# Patient Record
Sex: FEMALE | Race: OTHER | HISPANIC OR LATINO | URBAN - METROPOLITAN AREA
[De-identification: names, ages, dates, MRNs, and addresses within clinical notes are randomized per-mention and may not be internally consistent; named-entity substitution may affect disease eponyms.]

---

## 2020-12-01 ENCOUNTER — INPATIENT (INPATIENT)
Facility: HOSPITAL | Age: 55
LOS: 2 days | Discharge: HOME | End: 2020-12-04
Attending: INTERNAL MEDICINE
Payer: MEDICAID

## 2020-12-01 VITALS
OXYGEN SATURATION: 98 % | TEMPERATURE: 98 F | RESPIRATION RATE: 18 BRPM | HEART RATE: 72 BPM | DIASTOLIC BLOOD PRESSURE: 76 MMHG | SYSTOLIC BLOOD PRESSURE: 177 MMHG

## 2020-12-01 DIAGNOSIS — Z98.891 HISTORY OF UTERINE SCAR FROM PREVIOUS SURGERY: Chronic | ICD-10-CM

## 2020-12-01 DIAGNOSIS — Z90.710 ACQUIRED ABSENCE OF BOTH CERVIX AND UTERUS: Chronic | ICD-10-CM

## 2020-12-01 DIAGNOSIS — Z98.51 TUBAL LIGATION STATUS: Chronic | ICD-10-CM

## 2020-12-01 LAB
ALBUMIN SERPL ELPH-MCNC: 5 G/DL — SIGNIFICANT CHANGE UP (ref 3.5–5.2)
ALP SERPL-CCNC: 115 U/L — SIGNIFICANT CHANGE UP (ref 30–115)
ALT FLD-CCNC: 22 U/L — SIGNIFICANT CHANGE UP (ref 0–41)
ANION GAP SERPL CALC-SCNC: 12 MMOL/L — SIGNIFICANT CHANGE UP (ref 7–14)
AST SERPL-CCNC: 21 U/L — SIGNIFICANT CHANGE UP (ref 0–41)
BASOPHILS # BLD AUTO: 0.07 K/UL — SIGNIFICANT CHANGE UP (ref 0–0.2)
BASOPHILS NFR BLD AUTO: 0.6 % — SIGNIFICANT CHANGE UP (ref 0–1)
BILIRUB SERPL-MCNC: 0.4 MG/DL — SIGNIFICANT CHANGE UP (ref 0.2–1.2)
BUN SERPL-MCNC: 14 MG/DL — SIGNIFICANT CHANGE UP (ref 10–20)
CALCIUM SERPL-MCNC: 10.5 MG/DL — HIGH (ref 8.5–10.1)
CHLORIDE SERPL-SCNC: 102 MMOL/L — SIGNIFICANT CHANGE UP (ref 98–110)
CO2 SERPL-SCNC: 27 MMOL/L — SIGNIFICANT CHANGE UP (ref 17–32)
CREAT SERPL-MCNC: 0.7 MG/DL — SIGNIFICANT CHANGE UP (ref 0.7–1.5)
EOSINOPHIL # BLD AUTO: 0.18 K/UL — SIGNIFICANT CHANGE UP (ref 0–0.7)
EOSINOPHIL NFR BLD AUTO: 1.6 % — SIGNIFICANT CHANGE UP (ref 0–8)
GLUCOSE SERPL-MCNC: 111 MG/DL — HIGH (ref 70–99)
HCG SERPL QL: NEGATIVE — SIGNIFICANT CHANGE UP
HCT VFR BLD CALC: 43.4 % — SIGNIFICANT CHANGE UP (ref 37–47)
HGB BLD-MCNC: 13.9 G/DL — SIGNIFICANT CHANGE UP (ref 12–16)
IMM GRANULOCYTES NFR BLD AUTO: 0.4 % — HIGH (ref 0.1–0.3)
LACTATE SERPL-SCNC: 1 MMOL/L — SIGNIFICANT CHANGE UP (ref 0.7–2)
LYMPHOCYTES # BLD AUTO: 19.8 % — LOW (ref 20.5–51.1)
LYMPHOCYTES # BLD AUTO: 2.18 K/UL — SIGNIFICANT CHANGE UP (ref 1.2–3.4)
MCHC RBC-ENTMCNC: 26.6 PG — LOW (ref 27–31)
MCHC RBC-ENTMCNC: 32 G/DL — SIGNIFICANT CHANGE UP (ref 32–37)
MCV RBC AUTO: 83.1 FL — SIGNIFICANT CHANGE UP (ref 81–99)
MONOCYTES # BLD AUTO: 0.53 K/UL — SIGNIFICANT CHANGE UP (ref 0.1–0.6)
MONOCYTES NFR BLD AUTO: 4.8 % — SIGNIFICANT CHANGE UP (ref 1.7–9.3)
NEUTROPHILS # BLD AUTO: 8.03 K/UL — HIGH (ref 1.4–6.5)
NEUTROPHILS NFR BLD AUTO: 72.8 % — SIGNIFICANT CHANGE UP (ref 42.2–75.2)
NRBC # BLD: 0 /100 WBCS — SIGNIFICANT CHANGE UP (ref 0–0)
PLATELET # BLD AUTO: 385 K/UL — SIGNIFICANT CHANGE UP (ref 130–400)
POTASSIUM SERPL-MCNC: 4.8 MMOL/L — SIGNIFICANT CHANGE UP (ref 3.5–5)
POTASSIUM SERPL-SCNC: 4.8 MMOL/L — SIGNIFICANT CHANGE UP (ref 3.5–5)
PROT SERPL-MCNC: 8.2 G/DL — HIGH (ref 6–8)
RBC # BLD: 5.22 M/UL — SIGNIFICANT CHANGE UP (ref 4.2–5.4)
RBC # FLD: 14.7 % — HIGH (ref 11.5–14.5)
SARS-COV-2 RNA SPEC QL NAA+PROBE: SIGNIFICANT CHANGE UP
SODIUM SERPL-SCNC: 141 MMOL/L — SIGNIFICANT CHANGE UP (ref 135–146)
WBC # BLD: 11.03 K/UL — HIGH (ref 4.8–10.8)
WBC # FLD AUTO: 11.03 K/UL — HIGH (ref 4.8–10.8)

## 2020-12-01 PROCEDURE — 99223 1ST HOSP IP/OBS HIGH 75: CPT

## 2020-12-01 PROCEDURE — 99285 EMERGENCY DEPT VISIT HI MDM: CPT

## 2020-12-01 PROCEDURE — 70491 CT SOFT TISSUE NECK W/DYE: CPT | Mod: 26

## 2020-12-01 RX ORDER — CHLORHEXIDINE GLUCONATE 213 G/1000ML
1 SOLUTION TOPICAL
Refills: 0 | Status: DISCONTINUED | OUTPATIENT
Start: 2020-12-01 | End: 2020-12-04

## 2020-12-01 RX ORDER — AMPICILLIN SODIUM AND SULBACTAM SODIUM 250; 125 MG/ML; MG/ML
3 INJECTION, POWDER, FOR SUSPENSION INTRAMUSCULAR; INTRAVENOUS ONCE
Refills: 0 | Status: COMPLETED | OUTPATIENT
Start: 2020-12-01 | End: 2020-12-01

## 2020-12-01 RX ORDER — OXYCODONE AND ACETAMINOPHEN 5; 325 MG/1; MG/1
1 TABLET ORAL EVERY 6 HOURS
Refills: 0 | Status: DISCONTINUED | OUTPATIENT
Start: 2020-12-01 | End: 2020-12-04

## 2020-12-01 RX ORDER — INFLUENZA VIRUS VACCINE 15; 15; 15; 15 UG/.5ML; UG/.5ML; UG/.5ML; UG/.5ML
0.5 SUSPENSION INTRAMUSCULAR ONCE
Refills: 0 | Status: DISCONTINUED | OUTPATIENT
Start: 2020-12-01 | End: 2020-12-04

## 2020-12-01 RX ORDER — PANTOPRAZOLE SODIUM 20 MG/1
40 TABLET, DELAYED RELEASE ORAL
Refills: 0 | Status: DISCONTINUED | OUTPATIENT
Start: 2020-12-01 | End: 2020-12-04

## 2020-12-01 RX ORDER — HYDROCHLOROTHIAZIDE 25 MG
25 TABLET ORAL DAILY
Refills: 0 | Status: DISCONTINUED | OUTPATIENT
Start: 2020-12-01 | End: 2020-12-04

## 2020-12-01 RX ORDER — MORPHINE SULFATE 50 MG/1
6 CAPSULE, EXTENDED RELEASE ORAL ONCE
Refills: 0 | Status: DISCONTINUED | OUTPATIENT
Start: 2020-12-01 | End: 2020-12-01

## 2020-12-01 RX ORDER — ACETAMINOPHEN 500 MG
650 TABLET ORAL EVERY 6 HOURS
Refills: 0 | Status: DISCONTINUED | OUTPATIENT
Start: 2020-12-01 | End: 2020-12-04

## 2020-12-01 RX ORDER — DEXAMETHASONE 0.5 MG/5ML
6 ELIXIR ORAL EVERY 8 HOURS
Refills: 0 | Status: DISCONTINUED | OUTPATIENT
Start: 2020-12-01 | End: 2020-12-03

## 2020-12-01 RX ORDER — SODIUM CHLORIDE 9 MG/ML
1000 INJECTION INTRAMUSCULAR; INTRAVENOUS; SUBCUTANEOUS ONCE
Refills: 0 | Status: COMPLETED | OUTPATIENT
Start: 2020-12-01 | End: 2020-12-01

## 2020-12-01 RX ORDER — KETOROLAC TROMETHAMINE 30 MG/ML
15 SYRINGE (ML) INJECTION ONCE
Refills: 0 | Status: DISCONTINUED | OUTPATIENT
Start: 2020-12-01 | End: 2020-12-01

## 2020-12-01 RX ORDER — MORPHINE SULFATE 50 MG/1
2 CAPSULE, EXTENDED RELEASE ORAL ONCE
Refills: 0 | Status: DISCONTINUED | OUTPATIENT
Start: 2020-12-01 | End: 2020-12-01

## 2020-12-01 RX ORDER — DEXAMETHASONE 0.5 MG/5ML
6 ELIXIR ORAL ONCE
Refills: 0 | Status: COMPLETED | OUTPATIENT
Start: 2020-12-01 | End: 2020-12-01

## 2020-12-01 RX ORDER — TRAMADOL HYDROCHLORIDE 50 MG/1
25 TABLET ORAL EVERY 6 HOURS
Refills: 0 | Status: DISCONTINUED | OUTPATIENT
Start: 2020-12-01 | End: 2020-12-04

## 2020-12-01 RX ADMIN — Medication 15 MILLIGRAM(S): at 16:21

## 2020-12-01 RX ADMIN — MORPHINE SULFATE 2 MILLIGRAM(S): 50 CAPSULE, EXTENDED RELEASE ORAL at 12:23

## 2020-12-01 RX ADMIN — Medication 101.2 MILLIGRAM(S): at 19:52

## 2020-12-01 RX ADMIN — SODIUM CHLORIDE 1000 MILLILITER(S): 9 INJECTION INTRAMUSCULAR; INTRAVENOUS; SUBCUTANEOUS at 12:23

## 2020-12-01 RX ADMIN — AMPICILLIN SODIUM AND SULBACTAM SODIUM 200 GRAM(S): 250; 125 INJECTION, POWDER, FOR SUSPENSION INTRAMUSCULAR; INTRAVENOUS at 18:19

## 2020-12-01 RX ADMIN — Medication 650 MILLIGRAM(S): at 23:25

## 2020-12-01 RX ADMIN — MORPHINE SULFATE 6 MILLIGRAM(S): 50 CAPSULE, EXTENDED RELEASE ORAL at 18:19

## 2020-12-01 NOTE — ED PROVIDER NOTE - OBJECTIVE STATEMENT
54 y.o female w/ hx of HTN presents to the ED for evaluation of R facial swelling x 2 days.  States she developed pain R submandibular region yesterday, woke up this morning and area was significantly swollen.  Constant pain, throbbing, mild severity, no radiation of pain.  No trismus, fever, chills, difficulty tolerating secretions, odynophagia, headache, dyspnea, chest pain.

## 2020-12-01 NOTE — ED PROVIDER NOTE - PROGRESS NOTE DETAILS
spoke w/ ent/  aware of consult. spoke w/ ent.  recommends admission to medicine.  decadron 6mg q8, abx, dental consult in AM, pain control, warm compresses. mar aware of admission.

## 2020-12-01 NOTE — ED PROVIDER NOTE - CARE PLAN
Principal Discharge DX:	Sialolithiasis  Secondary Diagnosis:	Sialoadenitis  Secondary Diagnosis:	Intractable pain

## 2020-12-01 NOTE — H&P ADULT - HISTORY OF PRESENT ILLNESS
54F with PMH of HTN presents to the ED complaining of R submandibular swelling x 2 days.  Pt states that the cap that was implanted over her tooth in her lower R jaw had fallen out approximately 2 weeks ago and she had since tried to make an appointment w/ her dentist to repair her cap/tooth. Pt states that she developed pain R submandibular region 10/10 yesterday and woke up this morning with worsened pain and swelling of the area. Pain radiates to R ear as well. Pt had taken advil yesterday but it has not helped her pain. Pt endorses constant worsening pain in her R submandibular region and R anterior neck, odynophagia that is worsened upon eating/drinking and mild trismus. Pt denies fevers, chills, difficulty tolerating secretions, headache, n/v, dyspnea.    In the ED patient was afebrile, normotensive, sat well on RA. Labs notable for leukocytosis 11.03, hypercalcemia 10.5.     CT neck w/IV cont showed sialolithiasis and postobstructive sialoadenitis of the right submandibular gland with significant surrounding inflammatory changes and edema involving the parapharyngeal, submandibular and carotid spaces. Numerous proximal Maurilio's duct stones measure up to 6 mm. Prominent level 1A and right level IIb lymph nodes, likely reactive in nature. Atherosclerotic calcification of the distal right ICA/proximal M1 segment of the MCA    PAST MEDICAL & SURGICAL HISTORY  HTN (hypertension)    SOCIAL HISTORY:  Negative for smoking/alcohol/drug use.     ALLERGIES:  No Known Allergies    MEDICATIONS:  HOME MEDICATIONS    STANDING MEDICATIONS    PRN MEDICATIONS    VITALS:   ICU Vital Signs Last 24 Hrs  T(C): 37.1 (01 Dec 2020 20:32), Max: 37.1 (01 Dec 2020 20:32)  T(F): 98.8 (01 Dec 2020 20:32), Max: 98.8 (01 Dec 2020 20:32)  HR: 98 (01 Dec 2020 20:32) (72 - 98)  BP: 123/71 (01 Dec 2020 20:32) (123/71 - 177/76)  BP(mean): --  ABP: --  ABP(mean): --  RR: 18 (01 Dec 2020 20:32) (18 - 18)  SpO2: 98% (01 Dec 2020 20:32) (98% - 98%)    LABS:                        13.9   11.03 )-----------( 385      ( 01 Dec 2020 12:46 )             43.4     12-01    141  |  102  |  14  ----------------------------<  111<H>  4.8   |  27  |  0.7    Ca    10.5<H>      01 Dec 2020 12:46    TPro  8.2<H>  /  Alb  5.0  /  TBili  0.4  /  DBili  x   /  AST  21  /  ALT  22  /  AlkPhos  115  12-01    I&O's Detail      Lactate, Blood: 1.0 mmol/L (12-01-20 @ 12:46)    PHYSICAL EXAM:  GEN: No acute distress  HEENT: Normocephalic  NECK: R submandibular edema, warm to touch  LUNGS: Decreased breathe sounds  HEART: Regular  ABD: Soft, non-tender, non-distended.  EXT: NE/2+PP  NEURO: AAOX3  PSYCH: Appropriate mood, responds appropriately

## 2020-12-01 NOTE — ED PROVIDER NOTE - PHYSICAL EXAMINATION
CONST: Well appearing in NAD  EYES: Sclera and conjunctiva clear.  ENT:   Oropharynx normal appearing, no erythema or exudates. Uvula midline., no trismus, tolerating secretions, mild TTP tooth + 31.   NECK: + R anterior neck swelling and tenderness, no overlying skin changes. no meningeal signs, supple  CARD: Normal S1 S2; Normal rate and rhythm  RESP: Equal BS B/L, No wheezes, rhonchi or rales. No distress  GI: Soft, non-tender, non-distended.  MS: Normal ROM in all extremities. No edema of lower extremities, no calf pain, radial pulses 2+ bilaterally  SKIN: Warm, dry, no acute rashes. Good turgor  NEURO: A&Ox3, No focal deficits. Strength 5/5 with no sensory deficits. Steady gait

## 2020-12-01 NOTE — CONSULT NOTE ADULT - ASSESSMENT
54 year old female w/ hx of HTN complaining of R sided submandibular and R sided facial pain and swelling x 2 days.    ·	No acute ENT intervention indicated at this time  ·	Warm compress to the R submandibular region  ·	Sialogogues to help treat obstruction  ·	Continue IV ABX  ·	Continue pain control  ·	Consider dental consult to evaluate tooth pain  ·	Encourage po water intake  ·	Will d/w attending 54 year old female w/ hx of HTN complaining of R sided submandibular and R sided facial pain and swelling x 2 days.     ·	Case discussed w/ Dr Ashford, recommend decadron 6mg q 8 hrs   ·	Admit to medicine, monitor vitals  ·	Warm compress to the R submandibular region  ·	Continue IV ABX  ·	Continue pain control  ·	Consider dental consult to evaluate tooth pain  ·	Encourage po water intake

## 2020-12-01 NOTE — ED PROVIDER NOTE - CLINICAL SUMMARY MEDICAL DECISION MAKING FREE TEXT BOX
54 year old female with a pmh of htn presents here for facial / neck swelling. Patient states 2 weeks ago she had a cap fall off her tooth. Patient  began having mild tooth pain yesterday which increased over night. Patient noticed right neck swelling and pain radiating to her throat and ear which was what prompted her to come to the ed. No fever chills sob difficulty swallowing or speaking. VS reviewed. Labs imaging obtained and reviewed. Patient found to have Findings consistent with sialolithiasis and postobstructive sialoadenitis of the right submandibular gland with significant surrounding inflammatory changes and edema involving the parapharyngeal, submandibular and carotid spaces. Numerous proximal Maurilio's duct stones measure up to 6 mm. Multiple dose of pain control given. ENT consulted who recommended admission for pain control and IV antibiotics.

## 2020-12-01 NOTE — ED PROVIDER NOTE - ATTENDING CONTRIBUTION TO CARE
54 year old female with a pmh of htn presents here for facial / neck swelling. Patient states 2 weeks ago she had a cap fall off her tooth. Patient  began having mild tooth pain yesterday which increased over night. Patient noticed right neck swelling and pain radiating to her throat and ear which was what prompted her to come to the ed. No fever chills sob difficulty swallowing or speeching. No n/v abdominal pain or cp.  On exam  CONSTITUTIONAL: WA / WN / NAD  HEAD: NCAT  EYES: PERRL; EOMI; anicteric.  ENT: Normal pharynx; mucous membranes pink/moist, no erythema.  Airway patent. No tonsillar exudates. No swelling underneath tongue. +no apical abscess seen at tooth #31. Ears TM'S normal BL  NECK: Supple; + right anterior neck swelling. No erythema + warm to touch.   CARD: RRR; nl S1/S2; no M/R/G.   RESP: Respiratory rate and effort are normal; breath sounds clear and equal bilaterally.  ABD: Soft, NT ND nl bowel sounds; no masses; no rebound  MSK/EXT: No gross deformities; full range of motion.  SKIN: Warm and dry;   NEURO: AAOx3, Motor 5/5 x 4 extremities, Sensations intact to pain and palpation, Cerebellar testing normal  PSYCH: Memory Intact, Normal Affect

## 2020-12-01 NOTE — ED ADULT TRIAGE NOTE - CHIEF COMPLAINT QUOTE
Pt c/o right bottom dental pain and left jaw swelling since yesterday. Denies SOB, trouble breathing.

## 2020-12-01 NOTE — H&P ADULT - ASSESSMENT
54F with PMH of HTN presents to the ED complaining of R submandibular swelling    #R submandibular cellulitis  - CT neck showed sialolithiasis and postobstructive sialoadenitis of the right submandibular gland with significant surrounding inflammatory changes and edema involving the parapharyngeal, submandibular and carotid spaces. Numerous proximal [place]'s duct stones measure up to 6 mm. Prominent level 1A and right level IIb lymph nodes, likely reactive in nature  - Leukocytosis  - ENT recs decadron 6mg q 8 hrs, IV abx, pain control and dental consult  - f/u procalc, blood cx    #HTN - c/w home hydrochlorothiazide    #Hyperglycemia - f/u a1c, lipid panel    Diet - carb consistent  DVT ppx - lovenox  Activity - IAT  Dispo - med/surg  GI ppx - protonix 54F with PMH of HTN presents to the ED complaining of R submandibular swelling    #R submandibular mass  - cellulitis vs abscess  - CT neck showed sialolithiasis and postobstructive sialoadenitis of the right submandibular gland with significant surrounding inflammatory changes and edema involving the parapharyngeal, submandibular and carotid spaces. Numerous proximal Maurilio's duct stones measure up to 6 mm. Prominent level 1A and right level IIb lymph nodes, likely reactive in nature  - Leukocytosis  - ENT recs decadron 6mg q 8 hrs, IV abx, pain control and dental consult  - f/u procalc, blood cx    #HTN - c/w home hydrochlorothiazide    #Hyperglycemia - f/u a1c, lipid panel    Diet - carb consistent  DVT ppx - lovenox  Activity - IAT  Dispo - med/surg  GI ppx - protonix 54F with PMH of HTN presents to the ED complaining of R submandibular swelling    #R submandibular mass  - cellulitis vs abscess  - CT neck showed sialolithiasis and postobstructive sialoadenitis of the right submandibular gland with significant surrounding inflammatory changes and edema involving the parapharyngeal, submandibular and carotid spaces. Numerous proximal Maurilio's duct stones measure up to 6 mm. Prominent level 1A and right level IIb lymph nodes, likely reactive in nature  - Leukocytosis  - ENT recs decadron 6mg q 8 hrs, IV abx, pain control and dental consult  - Started unasyn 3g q6h x 7d for Odontogenic infection  - f/u procalc, blood cx    #HTN - c/w home hydrochlorothiazide    #Hyperglycemia - f/u a1c, lipid panel    Diet - carb consistent  DVT ppx - lovenox  Activity - IAT  Dispo - med/surg  GI ppx - protonix 54F with PMH of HTN presents to the ED complaining of R submandibular swelling    #R submandibular mass  - cellulitis vs developing abscess  - CT neck showed sialolithiasis and postobstructive sialoadenitis of the right submandibular gland with significant surrounding inflammatory changes and edema involving the parapharyngeal, submandibular and carotid spaces. Numerous proximal Maurilio's duct stones measure up to 6 mm. Prominent level 1A and right level IIb lymph nodes, likely reactive in nature  - Leukocytosis  - ENT recs decadron 6mg q 8 hrs, IV abx, pain control and dental consult  - Started unasyn 3g q6h x 7d for Odontogenic infection  - f/u procalc, blood cx    #HTN - c/w home hydrochlorothiazide    #Hyperglycemia - f/u a1c, lipid panel    Diet - carb consistent  DVT ppx - lovenox  Activity - IAT  Dispo - med/surg  GI ppx - protonix 54F with PMH of HTN presents to the ED complaining of R submandibular swelling    #R submandibular swelling  - cellulitis vs developing abscess  - CT neck showed sialolithiasis and postobstructive sialoadenitis of the right submandibular gland with significant surrounding inflammatory changes and edema involving the parapharyngeal, submandibular and carotid spaces. Numerous proximal [place]'s duct stones measure up to 6 mm. Prominent level 1A and right level IIb lymph nodes, likely reactive in nature  - Leukocytosis  - ENT recs decadron 6mg q 8 hrs, IV abx, pain control and dental consult  - Started unasyn 3g q6h x 7d for Odontogenic infection  - f/u procalc, blood cx    #HTN - c/w home hydrochlorothiazide    #Hyperglycemia - f/u a1c, lipid panel    Diet - carb consistent  DVT ppx - lovenox  Activity - IAT  Dispo - med/surg  GI ppx - protonix

## 2020-12-01 NOTE — ED PROVIDER NOTE - NS ED ROS FT
Constitutional: See HPI.  Eyes: No visual changes, eye pain or discharge.   ENMT: + submandibular swelling. No hearing changes, pain, discharge or infections.   Cardiac: No SOB or edema. No chest pain with exertion.  Respiratory: No cough or respiratory distress.   GI: No nausea, vomiting, diarrhea or abdominal pain.  : No dysuria, frequency or burning. No Discharge  MS: No myalgia, muscle weakness, joint pain or back pain.  Neuro: No headache or weakness.   Skin: No skin rash.  Except as documented in the HPI, all other systems are negative.

## 2020-12-01 NOTE — H&P ADULT - NSICDXPASTSURGICALHX_GEN_ALL_CORE_FT
PAST SURGICAL HISTORY:  History of 2  sections     History of bilateral ligation of fallopian tubes     History of hysterectomy

## 2020-12-02 LAB
A1C WITH ESTIMATED AVERAGE GLUCOSE RESULT: 6.3 % — HIGH (ref 4–5.6)
ALBUMIN SERPL ELPH-MCNC: 4.5 G/DL — SIGNIFICANT CHANGE UP (ref 3.5–5.2)
ALP SERPL-CCNC: 104 U/L — SIGNIFICANT CHANGE UP (ref 30–115)
ALT FLD-CCNC: 16 U/L — SIGNIFICANT CHANGE UP (ref 0–41)
ANION GAP SERPL CALC-SCNC: 15 MMOL/L — HIGH (ref 7–14)
APPEARANCE UR: CLEAR — SIGNIFICANT CHANGE UP
AST SERPL-CCNC: 12 U/L — SIGNIFICANT CHANGE UP (ref 0–41)
BACTERIA # UR AUTO: NEGATIVE — SIGNIFICANT CHANGE UP
BASOPHILS # BLD AUTO: 0.01 K/UL — SIGNIFICANT CHANGE UP (ref 0–0.2)
BASOPHILS NFR BLD AUTO: 0.1 % — SIGNIFICANT CHANGE UP (ref 0–1)
BILIRUB SERPL-MCNC: 0.5 MG/DL — SIGNIFICANT CHANGE UP (ref 0.2–1.2)
BILIRUB UR-MCNC: NEGATIVE — SIGNIFICANT CHANGE UP
BLD GP AB SCN SERPL QL: SIGNIFICANT CHANGE UP
BUN SERPL-MCNC: 14 MG/DL — SIGNIFICANT CHANGE UP (ref 10–20)
CALCIUM SERPL-MCNC: 9.4 MG/DL — SIGNIFICANT CHANGE UP (ref 8.5–10.1)
CHLORIDE SERPL-SCNC: 100 MMOL/L — SIGNIFICANT CHANGE UP (ref 98–110)
CHOLEST SERPL-MCNC: 198 MG/DL — SIGNIFICANT CHANGE UP
CO2 SERPL-SCNC: 24 MMOL/L — SIGNIFICANT CHANGE UP (ref 17–32)
COLOR SPEC: YELLOW — SIGNIFICANT CHANGE UP
COMMENT - URINE: SIGNIFICANT CHANGE UP
CREAT SERPL-MCNC: 0.7 MG/DL — SIGNIFICANT CHANGE UP (ref 0.7–1.5)
DIFF PNL FLD: ABNORMAL
EOSINOPHIL # BLD AUTO: 0 K/UL — SIGNIFICANT CHANGE UP (ref 0–0.7)
EOSINOPHIL NFR BLD AUTO: 0 % — SIGNIFICANT CHANGE UP (ref 0–8)
EPI CELLS # UR: 12 /HPF — HIGH (ref 0–5)
ESTIMATED AVERAGE GLUCOSE: 134 MG/DL — HIGH (ref 68–114)
GLUCOSE SERPL-MCNC: 185 MG/DL — HIGH (ref 70–99)
GLUCOSE UR QL: NEGATIVE — SIGNIFICANT CHANGE UP
HCT VFR BLD CALC: 38.9 % — SIGNIFICANT CHANGE UP (ref 37–47)
HDLC SERPL-MCNC: 71 MG/DL — SIGNIFICANT CHANGE UP
HGB BLD-MCNC: 12.6 G/DL — SIGNIFICANT CHANGE UP (ref 12–16)
HYALINE CASTS # UR AUTO: 3 /LPF — SIGNIFICANT CHANGE UP (ref 0–7)
IMM GRANULOCYTES NFR BLD AUTO: 0.6 % — HIGH (ref 0.1–0.3)
KETONES UR-MCNC: ABNORMAL
LACTATE SERPL-SCNC: 1.2 MMOL/L — SIGNIFICANT CHANGE UP (ref 0.7–2)
LEUKOCYTE ESTERASE UR-ACNC: NEGATIVE — SIGNIFICANT CHANGE UP
LIPID PNL WITH DIRECT LDL SERPL: 123 MG/DL — HIGH
LYMPHOCYTES # BLD AUTO: 0.78 K/UL — LOW (ref 1.2–3.4)
LYMPHOCYTES # BLD AUTO: 6.2 % — LOW (ref 20.5–51.1)
MCHC RBC-ENTMCNC: 26.6 PG — LOW (ref 27–31)
MCHC RBC-ENTMCNC: 32.4 G/DL — SIGNIFICANT CHANGE UP (ref 32–37)
MCV RBC AUTO: 82.2 FL — SIGNIFICANT CHANGE UP (ref 81–99)
MONOCYTES # BLD AUTO: 0.3 K/UL — SIGNIFICANT CHANGE UP (ref 0.1–0.6)
MONOCYTES NFR BLD AUTO: 2.4 % — SIGNIFICANT CHANGE UP (ref 1.7–9.3)
NEUTROPHILS # BLD AUTO: 11.5 K/UL — HIGH (ref 1.4–6.5)
NEUTROPHILS NFR BLD AUTO: 90.7 % — HIGH (ref 42.2–75.2)
NITRITE UR-MCNC: NEGATIVE — SIGNIFICANT CHANGE UP
NON HDL CHOLESTEROL: 127 MG/DL — SIGNIFICANT CHANGE UP
NRBC # BLD: 0 /100 WBCS — SIGNIFICANT CHANGE UP (ref 0–0)
PH UR: 6.5 — SIGNIFICANT CHANGE UP (ref 5–8)
PLATELET # BLD AUTO: 363 K/UL — SIGNIFICANT CHANGE UP (ref 130–400)
POTASSIUM SERPL-MCNC: 4.3 MMOL/L — SIGNIFICANT CHANGE UP (ref 3.5–5)
POTASSIUM SERPL-SCNC: 4.3 MMOL/L — SIGNIFICANT CHANGE UP (ref 3.5–5)
PROCALCITONIN SERPL-MCNC: 0.07 NG/ML — SIGNIFICANT CHANGE UP (ref 0.02–0.1)
PROT SERPL-MCNC: 7.5 G/DL — SIGNIFICANT CHANGE UP (ref 6–8)
PROT UR-MCNC: ABNORMAL
RBC # BLD: 4.73 M/UL — SIGNIFICANT CHANGE UP (ref 4.2–5.4)
RBC # FLD: 14.6 % — HIGH (ref 11.5–14.5)
RBC CASTS # UR COMP ASSIST: 6 /HPF — HIGH (ref 0–4)
SODIUM SERPL-SCNC: 139 MMOL/L — SIGNIFICANT CHANGE UP (ref 135–146)
SP GR SPEC: 1.04 — HIGH (ref 1.01–1.03)
TRIGL SERPL-MCNC: 102 MG/DL — SIGNIFICANT CHANGE UP
UROBILINOGEN FLD QL: SIGNIFICANT CHANGE UP
WBC # BLD: 12.67 K/UL — HIGH (ref 4.8–10.8)
WBC # FLD AUTO: 12.67 K/UL — HIGH (ref 4.8–10.8)
WBC UR QL: 5 /HPF — SIGNIFICANT CHANGE UP (ref 0–5)

## 2020-12-02 PROCEDURE — 93010 ELECTROCARDIOGRAM REPORT: CPT

## 2020-12-02 PROCEDURE — 99232 SBSQ HOSP IP/OBS MODERATE 35: CPT

## 2020-12-02 PROCEDURE — 99233 SBSQ HOSP IP/OBS HIGH 50: CPT | Mod: GC

## 2020-12-02 RX ORDER — SODIUM CHLORIDE 9 MG/ML
1000 INJECTION, SOLUTION INTRAVENOUS
Refills: 0 | Status: DISCONTINUED | OUTPATIENT
Start: 2020-12-02 | End: 2020-12-04

## 2020-12-02 RX ORDER — ENOXAPARIN SODIUM 100 MG/ML
40 INJECTION SUBCUTANEOUS EVERY 12 HOURS
Refills: 0 | Status: DISCONTINUED | OUTPATIENT
Start: 2020-12-02 | End: 2020-12-03

## 2020-12-02 RX ORDER — AMPICILLIN SODIUM AND SULBACTAM SODIUM 250; 125 MG/ML; MG/ML
3 INJECTION, POWDER, FOR SUSPENSION INTRAMUSCULAR; INTRAVENOUS EVERY 6 HOURS
Refills: 0 | Status: DISCONTINUED | OUTPATIENT
Start: 2020-12-02 | End: 2020-12-04

## 2020-12-02 RX ORDER — ONDANSETRON 8 MG/1
2 TABLET, FILM COATED ORAL EVERY 6 HOURS
Refills: 0 | Status: DISCONTINUED | OUTPATIENT
Start: 2020-12-02 | End: 2020-12-04

## 2020-12-02 RX ORDER — BENZOCAINE 10 %
1 GEL (GRAM) MUCOUS MEMBRANE
Refills: 0 | Status: DISCONTINUED | OUTPATIENT
Start: 2020-12-02 | End: 2020-12-04

## 2020-12-02 RX ADMIN — SODIUM CHLORIDE 75 MILLILITER(S): 9 INJECTION, SOLUTION INTRAVENOUS at 10:45

## 2020-12-02 RX ADMIN — AMPICILLIN SODIUM AND SULBACTAM SODIUM 200 GRAM(S): 250; 125 INJECTION, POWDER, FOR SUSPENSION INTRAMUSCULAR; INTRAVENOUS at 05:05

## 2020-12-02 RX ADMIN — Medication 1 SPRAY(S): at 10:49

## 2020-12-02 RX ADMIN — ONDANSETRON 2 MILLIGRAM(S): 8 TABLET, FILM COATED ORAL at 17:13

## 2020-12-02 RX ADMIN — ENOXAPARIN SODIUM 40 MILLIGRAM(S): 100 INJECTION SUBCUTANEOUS at 17:13

## 2020-12-02 RX ADMIN — ENOXAPARIN SODIUM 40 MILLIGRAM(S): 100 INJECTION SUBCUTANEOUS at 05:13

## 2020-12-02 RX ADMIN — AMPICILLIN SODIUM AND SULBACTAM SODIUM 200 GRAM(S): 250; 125 INJECTION, POWDER, FOR SUSPENSION INTRAMUSCULAR; INTRAVENOUS at 17:13

## 2020-12-02 RX ADMIN — OXYCODONE AND ACETAMINOPHEN 1 TABLET(S): 5; 325 TABLET ORAL at 05:32

## 2020-12-02 RX ADMIN — SODIUM CHLORIDE 75 MILLILITER(S): 9 INJECTION, SOLUTION INTRAVENOUS at 23:15

## 2020-12-02 RX ADMIN — AMPICILLIN SODIUM AND SULBACTAM SODIUM 200 GRAM(S): 250; 125 INJECTION, POWDER, FOR SUSPENSION INTRAMUSCULAR; INTRAVENOUS at 11:01

## 2020-12-02 RX ADMIN — Medication 25 MILLIGRAM(S): at 05:14

## 2020-12-02 RX ADMIN — OXYCODONE AND ACETAMINOPHEN 1 TABLET(S): 5; 325 TABLET ORAL at 19:16

## 2020-12-02 RX ADMIN — OXYCODONE AND ACETAMINOPHEN 1 TABLET(S): 5; 325 TABLET ORAL at 18:35

## 2020-12-02 RX ADMIN — OXYCODONE AND ACETAMINOPHEN 1 TABLET(S): 5; 325 TABLET ORAL at 12:30

## 2020-12-02 RX ADMIN — Medication 6 MILLIGRAM(S): at 22:55

## 2020-12-02 RX ADMIN — OXYCODONE AND ACETAMINOPHEN 1 TABLET(S): 5; 325 TABLET ORAL at 11:34

## 2020-12-02 RX ADMIN — PANTOPRAZOLE SODIUM 40 MILLIGRAM(S): 20 TABLET, DELAYED RELEASE ORAL at 05:14

## 2020-12-02 RX ADMIN — Medication 6 MILLIGRAM(S): at 05:14

## 2020-12-02 RX ADMIN — Medication 6 MILLIGRAM(S): at 15:41

## 2020-12-02 RX ADMIN — AMPICILLIN SODIUM AND SULBACTAM SODIUM 200 GRAM(S): 250; 125 INJECTION, POWDER, FOR SUSPENSION INTRAMUSCULAR; INTRAVENOUS at 23:15

## 2020-12-02 RX ADMIN — AMPICILLIN SODIUM AND SULBACTAM SODIUM 200 GRAM(S): 250; 125 INJECTION, POWDER, FOR SUSPENSION INTRAMUSCULAR; INTRAVENOUS at 00:28

## 2020-12-02 NOTE — PROGRESS NOTE ADULT - ASSESSMENT
54 year old female w/ sialoadenitis and sialolithiasis    ·	Warm compress to the R submandibular region  ·	Encourage frequent gentle massage of R submandibular region  ·	Continue IV ABX  ·	IV fluid hydration  ·	Continue pain control  ·	Encourage po water intake  ·	Seen with Dr. Olivera

## 2020-12-02 NOTE — PROGRESS NOTE ADULT - SUBJECTIVE AND OBJECTIVE BOX
ENT DAILY PROGRESS NOTE  55 y/o female with sialoadenitis and sialolithiasis on the right submandibular gland. Pt seen and examined at bedside. Pt reports passing 4 stones last night and had some subsequent bleeding which resolved on its own. Continues to have considerable jaw discomfort on the right. Deneis fevers, chills, N/V.     REVIEW OF SYSTEMS   [x] A ten-point review of systems was otherwise negative except as noted.  [ ] Due to altered mental status/intubation, subjective information were not able to be obtained from patient. History was obtained, to the extent possible, from review of the chart and collateral sources of information.    Allergies    No Known Allergies      MEDICATIONS:  Antiinfectives:   ampicillin/sulbactam  IVPB 3 Gram(s) IV Intermittent every 6 hours    IV fluids:    Hematologic/Anticoagulation:  enoxaparin Injectable 40 milliGRAM(s) SubCutaneous every 12 hours    Pain medications/Neuro:  acetaminophen   Tablet .. 650 milliGRAM(s) Oral every 6 hours PRN  oxycodone    5 mG/acetaminophen 325 mG 1 Tablet(s) Oral every 6 hours PRN  traMADol 25 milliGRAM(s) Oral every 6 hours PRN    Endocrine Medications:   dexAMETHasone  Injectable 6 milliGRAM(s) IV Push every 8 hours    All other standing medications:   chlorhexidine 4% Liquid 1 Application(s) Topical <User Schedule>  hydrochlorothiazide 25 milliGRAM(s) Oral daily  influenza   Vaccine 0.5 milliLiter(s) IntraMuscular once  pantoprazole    Tablet 40 milliGRAM(s) Oral before breakfast    All other PRN medications:    Vital Signs Last 24 Hrs  T(C): 36.3 (02 Dec 2020 04:40), Max: 37.8 (01 Dec 2020 23:18)  T(F): 97.3 (02 Dec 2020 04:40), Max: 100 (01 Dec 2020 23:18)  HR: 81 (02 Dec 2020 04:40) (72 - 98)  BP: 133/74 (02 Dec 2020 04:40) (123/71 - 177/76)  BP(mean): --  RR: 18 (02 Dec 2020 04:40) (18 - 18)  SpO2: 93% (02 Dec 2020 08:06) (93% - 98%)    PHYSICAL EXAM:      PHYSICAL EXAM:  Gen: awake, alert, NAD. No drooling or pooling of secretions. No trismus.   Skin: No rashes, bruises, or lesions  HEENT: Head NC/AT. Nares bilaterally patent, no blood or discharge noted. Oral cavity no erythema/edema. Tongue wnl. Uvula midline. Posterior oropharynx clear, no discharge/blood noted. No TTP to the floor of mouth or buccal mucosa b/l,  +TTP and swelling to the R submandibular region and R anterior neck,  +TTP to tooth #31, Tooth #31 without cap, No erythema throughout.  Resp: breathing easily, no stridor, no accessory muscle use   CV: no peripheral edema/cyanosis  GI: soft, nontender, nondistended  Neuro: A&Ox3  Psych: normal mood, normal affect        LABS:  CBC-                        12.6   12.67 )-----------( 363      ( 02 Dec 2020 06:50 )             38.9     BMP/CMP-  01 Dec 2020 12:46    141    |  102    |  14     ----------------------------<  111    4.8     |  27     |  0.7      Ca    10.5       01 Dec 2020 12:46    TPro  8.2    /  Alb  5.0    /  TBili  0.4    /  DBili  x      /  AST  21     /  ALT  22     /  AlkPhos  115    01 Dec 2020 12:46    Coagulation Studies-    Endocrine Panel-  Calcium, Total Serum: 10.5 mg/dL (12-01 @ 12:46)      RADIOLOGY & ADDITIONAL STUDIES:  < from: CT Neck Soft Tissue w/ IV Cont (12.01.20 @ 14:00) >    EXAM:  CT NECK SOFT TISSUE IC            PROCEDURE DATE:  12/01/2020            INTERPRETATION:  INDICATIONS:  Right-sided dental and facial pain    TECHNIQUE:   Axial images were obtained following the intravenous administration of contrast material. Sagittal and coronal reformatted images were obtained. 100 cc Omnipaque 350 were administered.  0. Cc were discarded.    COMPARISON EXAMINATION:  None.    FINDINGS:    SUBMANDIBULAR GLANDS: There are multiple sialoliths noted in the proximal rightWharton's duct measuring up to 6 mm which extend into the submandibular gland. There is significant enlargement and stranding surrounding the right submandibular gland. There is significant edema noted in the right parapharyngeal, carotid and submandibular spaces. There is no evidence of abscess. There is significant swelling noted in the right platysma with surrounding edema.    The left submandibular gland is unremarkable    AERODIGESTIVE TRACT:  There is no abnormal nodular or masslike enhancement. There is mild submucosal edema noted involving the right oropharynx..    LYMPH NODES:  There are prominent though nonenlarged level 1A lymph nodes measuring up to 1.0 cm.    There are multiple enlarged right level 2B lymph nodes measuring up to 1.6 x 1.2 cm    PAROTID GLANDS:  Normal.    THYROID GLAND:  Normal.    VISUALIZED PARANASAL SINUSES:  Clear.    VISUALIZED TYMPANOMASTOID CAVITIES: Clear.    BONES:  There are hemangiomas noted of the T1 and T3 vertebral bodies..    MISCELLANEOUS:  There is atherosclerotic calcification involving the distal right ICA and proximal M1 segment of the MCA..      IMPRESSION:  Findings consistent with sialolithiasis and postobstructive sialoadenitis of the right submandibular gland with significant surrounding inflammatory changes and edema involving the parapharyngeal, submandibular and carotid spaces. Numerous proximal [place]'s duct stones measure up to 6 mm.    Prominent level 1A and right level IIb lymph nodes, likely reactive in nature.    Atherosclerotic calcification of the distal right ICA/proximal M1 segment of the MCA.              HANNAH CHOUDHARY M.D., ATTENDING RADIOLOGIST  This document has been electronically signed. Dec  1 2020  2:24PM    < end of copied text >

## 2020-12-02 NOTE — PROGRESS NOTE ADULT - ASSESSMENT
54F with PMH of HTN presents to the ED complaining of R submandibular swelling, admitted for possible abscess, found to have [place]'s duct stones.    #R submandibular swelling likely due to underlying abscess vs inflammatory process  # Numerous [place]'s duct stones up to 6 mm, reactive lymph nodes identified on CT neck  - Pt found to have elevated WBC ct, had low grade fever overnight  - ENT c/s apprec - c/w Decadron 6mg q 8 hrs, IV abx, pain control   - Dental c/s pending  - Blood Cx pending  - C/w Unasyn    #HTN  - Well controlled   - C/w HCTZ    DVT ppx: Lovenox  GI ppx: Pantoprazole  Diet - carb consistent  Activity - IAT  Full code  Dispo: acute

## 2020-12-02 NOTE — PROGRESS NOTE ADULT - SUBJECTIVE AND OBJECTIVE BOX
Patient Information:  MASOUD FRANCISCO / 54y / Female / MRN#:[id number]    Hospital Day: 1d    Interval History:  Patient seen and examined at bedside. No acute events overnight.  Pt is resting in bed, appears in NAD. States that she continues to have a lot of pain in throat, R ear and R submandibular region. Throat pain causes difficulty swallowing liquids and swallowing.    Past Medical History:  Left inguinal hernia    HTN (hypertension)    No pertinent past medical history      Past Surgical History:  History of 2  sections    History of bilateral ligation of fallopian tubes    History of hysterectomy      Allergies:  No Known Allergies    Medications:  PRN:  acetaminophen   Tablet .. 650 milliGRAM(s) Oral every 6 hours PRN Temp greater or equal to 38C (100.4F), Mild Pain (1 - 3)  oxycodone    5 mG/acetaminophen 325 mG 1 Tablet(s) Oral every 6 hours PRN Severe Pain (7 - 10)  traMADol 25 milliGRAM(s) Oral every 6 hours PRN Moderate Pain (4 - 6)    Standing:  ampicillin/sulbactam  IVPB 3 Gram(s) IV Intermittent every 6 hours  chlorhexidine 4% Liquid 1 Application(s) Topical <User Schedule>  dexAMETHasone  Injectable 6 milliGRAM(s) IV Push every 8 hours  enoxaparin Injectable 40 milliGRAM(s) SubCutaneous every 12 hours  hydrochlorothiazide 25 milliGRAM(s) Oral daily  influenza   Vaccine 0.5 milliLiter(s) IntraMuscular once  pantoprazole    Tablet 40 milliGRAM(s) Oral before breakfast    Home:  hydroCHLOROthiazide 25 mg oral tablet: 1 tab(s) orally once a day    Vitals:  T(C): 36.3, Max: 37.8 (20 @ 23:18)  T(F): 97.3, Max: 100 (20 @ 23:18)  HR: 81 (72 - 98)  BP: 133/74 (123/71 - 177/76)  RR: 18 (18 - 18)  SpO2: 93% (93% - 98%)    Physical Exam:  General: Awake, alert, NAD, resting comfortably in bed, on RA  HEENT: Normal appearing oral mucosa, R sided sub mandibular swelling, tenderness  Heart: RRR; S1/S2; no rubs, murmurs appreciated  Lungs: Clear to auscultation bilaterally, no wheezing, rales or rhonchi  Abdomen: Soft, nontender, nondistended, BS+  Extremities: No edema, clubbing, cyanosis in upper or lower extremities  Neuro: AAOx3, NFD.    Labs:  CBC ( @ 06:50)                        Hgb: 12.6   WBC: 12.67 )-----------------( Plts: 363                              Hct: 38.9     Chem ( @ 12:46)  Na: 141  |     Cl: 102     |  BUN: 14  -----------------------------------------< Gluc: 111    K: 4.8   |    HCO3: 27  |  Cr: 0.7    Ca 10.5 ( @ 12:46)    LFTs ( @ 12:46)  TPro 8.2  /  Alb 5.0  TBili 0.4  /  DBili     AST 21  /  ALT 22  /  AlkPhos 115    Cardiac Markers ( @ 06:50)  Troponin I X  Troponin T X  CK X  CKMB X  CKMB Units X  Myoglobin X  Lactate 1.2  ESR X    Cardiac Markers ( @ 12:46)  Troponin I X  Troponin T X  CK X  CKMB X  CKMB Units X  Myoglobin X  Lactate 1.0  ESR X          Urinalysis Basic ( @ 00:35)  Color: Yellow  Appearance: Clear  S.043  pH:   Gluc:   Ketone: Small  Bili: Negative  Urobili: <2 mg/dL   Blood:   Protein: 300 mg/dL  Nitrite: Negative   Leuk Esterase: Negative  RBC: 6  WBC: 5   Sq Epi:   Non Sq Epi: 12  Bacteria: Negative      Radiology:    < from: CT Neck Soft Tissue w/ IV Cont (20 @ 14:00) >  IMPRESSION:  Findings consistent with sialolithiasis and postobstructive sialoadenitis of the right submandibular gland with significant surrounding inflammatory changes and edema involving the parapharyngeal, submandibular and carotid spaces. Numerous proximal [place]'s duct stones measure up to 6 mm.    Prominent level 1A and right level IIb lymph nodes, likely reactive in nature.    Atherosclerotic calcification of the distal right ICA/proximal M1 segment of the MCA.    < end of copied text >     Patient Information:  MASOUD FRANCISCO / 54y / Female / MRN#:[id number]    Hospital Day: 1d    Interval History:  Patient seen and examined at bedside. No acute events overnight.  Pt is resting in bed, appears in NAD. States that she continues to have a lot of pain in throat, R ear and R submandibular region. Throat pain causes difficulty swallowing liquids and swallowing.  Pt reports she felt several small stones in her mouth this morning, has 4-5 small stones at bedside.    Past Medical History:  Left inguinal hernia    HTN (hypertension)    No pertinent past medical history      Past Surgical History:  History of 2  sections    History of bilateral ligation of fallopian tubes    History of hysterectomy      Allergies:  No Known Allergies    Medications:  PRN:  acetaminophen   Tablet .. 650 milliGRAM(s) Oral every 6 hours PRN Temp greater or equal to 38C (100.4F), Mild Pain (1 - 3)  oxycodone    5 mG/acetaminophen 325 mG 1 Tablet(s) Oral every 6 hours PRN Severe Pain (7 - 10)  traMADol 25 milliGRAM(s) Oral every 6 hours PRN Moderate Pain (4 - 6)    Standing:  ampicillin/sulbactam  IVPB 3 Gram(s) IV Intermittent every 6 hours  chlorhexidine 4% Liquid 1 Application(s) Topical <User Schedule>  dexAMETHasone  Injectable 6 milliGRAM(s) IV Push every 8 hours  enoxaparin Injectable 40 milliGRAM(s) SubCutaneous every 12 hours  hydrochlorothiazide 25 milliGRAM(s) Oral daily  influenza   Vaccine 0.5 milliLiter(s) IntraMuscular once  pantoprazole    Tablet 40 milliGRAM(s) Oral before breakfast    Home:  hydroCHLOROthiazide 25 mg oral tablet: 1 tab(s) orally once a day    Vitals:  T(C): 36.3, Max: 37.8 (20 @ 23:18)  T(F): 97.3, Max: 100 (20 @ 23:18)  HR: 81 (72 - 98)  BP: 133/74 (123/71 - 177/76)  RR: 18 (18 - 18)  SpO2: 93% (93% - 98%)    Physical Exam:  General: Awake, alert, NAD, resting comfortably in bed, on RA  HEENT: Normal appearing oral mucosa, R sided sub mandibular swelling, tenderness  Heart: RRR; S1/S2; no rubs, murmurs appreciated  Lungs: Clear to auscultation bilaterally, no wheezing, rales or rhonchi  Abdomen: Soft, nontender, nondistended, BS+  Extremities: No edema, clubbing, cyanosis in upper or lower extremities  Neuro: AAOx3, NFD.    Labs:  CBC ( @ 06:50)                        Hgb: 12.6   WBC: 12.67 )-----------------( Plts: 363                              Hct: 38.9     Chem ( @ 12:46)  Na: 141  |     Cl: 102     |  BUN: 14  -----------------------------------------< Gluc: 111    K: 4.8   |    HCO3: 27  |  Cr: 0.7    Ca 10.5 ( @ 12:46)    LFTs ( @ 12:46)  TPro 8.2  /  Alb 5.0  TBili 0.4  /  DBili     AST 21  /  ALT 22  /  AlkPhos 115    Cardiac Markers ( @ 06:50)  Troponin I X  Troponin T X  CK X  CKMB X  CKMB Units X  Myoglobin X  Lactate 1.2  ESR X    Cardiac Markers ( @ 12:46)  Troponin I X  Troponin T X  CK X  CKMB X  CKMB Units X  Myoglobin X  Lactate 1.0  ESR X          Urinalysis Basic ( @ 00:35)  Color: Yellow  Appearance: Clear  S.043  pH:   Gluc:   Ketone: Small  Bili: Negative  Urobili: <2 mg/dL   Blood:   Protein: 300 mg/dL  Nitrite: Negative   Leuk Esterase: Negative  RBC: 6  WBC: 5   Sq Epi:   Non Sq Epi: 12  Bacteria: Negative      Radiology:    < from: CT Neck Soft Tissue w/ IV Cont (20 @ 14:00) >  IMPRESSION:  Findings consistent with sialolithiasis and postobstructive sialoadenitis of the right submandibular gland with significant surrounding inflammatory changes and edema involving the parapharyngeal, submandibular and carotid spaces. Numerous proximal [place]'s duct stones measure up to 6 mm.    Prominent level 1A and right level IIb lymph nodes, likely reactive in nature.    Atherosclerotic calcification of the distal right ICA/proximal M1 segment of the MCA.    < end of copied text >

## 2020-12-03 LAB
ANION GAP SERPL CALC-SCNC: 14 MMOL/L — SIGNIFICANT CHANGE UP (ref 7–14)
BASOPHILS # BLD AUTO: 0.01 K/UL — SIGNIFICANT CHANGE UP (ref 0–0.2)
BASOPHILS NFR BLD AUTO: 0.1 % — SIGNIFICANT CHANGE UP (ref 0–1)
BUN SERPL-MCNC: 16 MG/DL — SIGNIFICANT CHANGE UP (ref 10–20)
CALCIUM SERPL-MCNC: 9.7 MG/DL — SIGNIFICANT CHANGE UP (ref 8.5–10.1)
CHLORIDE SERPL-SCNC: 101 MMOL/L — SIGNIFICANT CHANGE UP (ref 98–110)
CO2 SERPL-SCNC: 24 MMOL/L — SIGNIFICANT CHANGE UP (ref 17–32)
CREAT SERPL-MCNC: 0.7 MG/DL — SIGNIFICANT CHANGE UP (ref 0.7–1.5)
CULTURE RESULTS: SIGNIFICANT CHANGE UP
EOSINOPHIL # BLD AUTO: 0 K/UL — SIGNIFICANT CHANGE UP (ref 0–0.7)
EOSINOPHIL NFR BLD AUTO: 0 % — SIGNIFICANT CHANGE UP (ref 0–8)
GLUCOSE BLDC GLUCOMTR-MCNC: 175 MG/DL — HIGH (ref 70–99)
GLUCOSE BLDC GLUCOMTR-MCNC: 200 MG/DL — HIGH (ref 70–99)
GLUCOSE BLDC GLUCOMTR-MCNC: 217 MG/DL — HIGH (ref 70–99)
GLUCOSE SERPL-MCNC: 191 MG/DL — HIGH (ref 70–99)
HCT VFR BLD CALC: 40.6 % — SIGNIFICANT CHANGE UP (ref 37–47)
HGB BLD-MCNC: 13 G/DL — SIGNIFICANT CHANGE UP (ref 12–16)
IMM GRANULOCYTES NFR BLD AUTO: 1 % — HIGH (ref 0.1–0.3)
LYMPHOCYTES # BLD AUTO: 1.05 K/UL — LOW (ref 1.2–3.4)
LYMPHOCYTES # BLD AUTO: 9.1 % — LOW (ref 20.5–51.1)
MCHC RBC-ENTMCNC: 26.3 PG — LOW (ref 27–31)
MCHC RBC-ENTMCNC: 32 G/DL — SIGNIFICANT CHANGE UP (ref 32–37)
MCV RBC AUTO: 82.2 FL — SIGNIFICANT CHANGE UP (ref 81–99)
MONOCYTES # BLD AUTO: 0.31 K/UL — SIGNIFICANT CHANGE UP (ref 0.1–0.6)
MONOCYTES NFR BLD AUTO: 2.7 % — SIGNIFICANT CHANGE UP (ref 1.7–9.3)
NEUTROPHILS # BLD AUTO: 10.08 K/UL — HIGH (ref 1.4–6.5)
NEUTROPHILS NFR BLD AUTO: 87.1 % — HIGH (ref 42.2–75.2)
NRBC # BLD: 0 /100 WBCS — SIGNIFICANT CHANGE UP (ref 0–0)
PLATELET # BLD AUTO: 409 K/UL — HIGH (ref 130–400)
POTASSIUM SERPL-MCNC: 4.3 MMOL/L — SIGNIFICANT CHANGE UP (ref 3.5–5)
POTASSIUM SERPL-SCNC: 4.3 MMOL/L — SIGNIFICANT CHANGE UP (ref 3.5–5)
RBC # BLD: 4.94 M/UL — SIGNIFICANT CHANGE UP (ref 4.2–5.4)
RBC # FLD: 14.6 % — HIGH (ref 11.5–14.5)
SARS-COV-2 IGG SERPL QL IA: POSITIVE
SARS-COV-2 IGM SERPL IA-ACNC: 1.97 INDEX — HIGH
SARS-COV-2 RNA SPEC QL NAA+PROBE: SIGNIFICANT CHANGE UP
SODIUM SERPL-SCNC: 139 MMOL/L — SIGNIFICANT CHANGE UP (ref 135–146)
SPECIMEN SOURCE: SIGNIFICANT CHANGE UP
WBC # BLD: 11.57 K/UL — HIGH (ref 4.8–10.8)
WBC # FLD AUTO: 11.57 K/UL — HIGH (ref 4.8–10.8)

## 2020-12-03 PROCEDURE — 99231 SBSQ HOSP IP/OBS SF/LOW 25: CPT

## 2020-12-03 PROCEDURE — 99233 SBSQ HOSP IP/OBS HIGH 50: CPT | Mod: GC

## 2020-12-03 RX ORDER — LABETALOL HCL 100 MG
10 TABLET ORAL ONCE
Refills: 0 | Status: COMPLETED | OUTPATIENT
Start: 2020-12-03 | End: 2020-12-03

## 2020-12-03 RX ORDER — INSULIN LISPRO 100/ML
8 VIAL (ML) SUBCUTANEOUS
Refills: 0 | Status: DISCONTINUED | OUTPATIENT
Start: 2020-12-03 | End: 2020-12-04

## 2020-12-03 RX ORDER — ENOXAPARIN SODIUM 100 MG/ML
40 INJECTION SUBCUTANEOUS AT BEDTIME
Refills: 0 | Status: DISCONTINUED | OUTPATIENT
Start: 2020-12-03 | End: 2020-12-04

## 2020-12-03 RX ORDER — POLYETHYLENE GLYCOL 3350 17 G/17G
17 POWDER, FOR SOLUTION ORAL
Refills: 0 | Status: DISCONTINUED | OUTPATIENT
Start: 2020-12-03 | End: 2020-12-04

## 2020-12-03 RX ORDER — INSULIN GLARGINE 100 [IU]/ML
24 INJECTION, SOLUTION SUBCUTANEOUS AT BEDTIME
Refills: 0 | Status: DISCONTINUED | OUTPATIENT
Start: 2020-12-03 | End: 2020-12-04

## 2020-12-03 RX ORDER — SENNA PLUS 8.6 MG/1
2 TABLET ORAL AT BEDTIME
Refills: 0 | Status: DISCONTINUED | OUTPATIENT
Start: 2020-12-03 | End: 2020-12-04

## 2020-12-03 RX ADMIN — Medication 8 UNIT(S): at 17:39

## 2020-12-03 RX ADMIN — SENNA PLUS 2 TABLET(S): 8.6 TABLET ORAL at 21:25

## 2020-12-03 RX ADMIN — AMPICILLIN SODIUM AND SULBACTAM SODIUM 200 GRAM(S): 250; 125 INJECTION, POWDER, FOR SUSPENSION INTRAMUSCULAR; INTRAVENOUS at 05:13

## 2020-12-03 RX ADMIN — AMPICILLIN SODIUM AND SULBACTAM SODIUM 200 GRAM(S): 250; 125 INJECTION, POWDER, FOR SUSPENSION INTRAMUSCULAR; INTRAVENOUS at 11:07

## 2020-12-03 RX ADMIN — PANTOPRAZOLE SODIUM 40 MILLIGRAM(S): 20 TABLET, DELAYED RELEASE ORAL at 05:25

## 2020-12-03 RX ADMIN — AMPICILLIN SODIUM AND SULBACTAM SODIUM 200 GRAM(S): 250; 125 INJECTION, POWDER, FOR SUSPENSION INTRAMUSCULAR; INTRAVENOUS at 17:38

## 2020-12-03 RX ADMIN — Medication 25 MILLIGRAM(S): at 05:13

## 2020-12-03 RX ADMIN — INSULIN GLARGINE 24 UNIT(S): 100 INJECTION, SOLUTION SUBCUTANEOUS at 21:25

## 2020-12-03 RX ADMIN — OXYCODONE AND ACETAMINOPHEN 1 TABLET(S): 5; 325 TABLET ORAL at 07:30

## 2020-12-03 RX ADMIN — Medication 6 MILLIGRAM(S): at 14:21

## 2020-12-03 RX ADMIN — ENOXAPARIN SODIUM 40 MILLIGRAM(S): 100 INJECTION SUBCUTANEOUS at 21:26

## 2020-12-03 RX ADMIN — TRAMADOL HYDROCHLORIDE 25 MILLIGRAM(S): 50 TABLET ORAL at 12:00

## 2020-12-03 RX ADMIN — OXYCODONE AND ACETAMINOPHEN 1 TABLET(S): 5; 325 TABLET ORAL at 21:24

## 2020-12-03 RX ADMIN — Medication 6 MILLIGRAM(S): at 05:13

## 2020-12-03 RX ADMIN — SODIUM CHLORIDE 75 MILLILITER(S): 9 INJECTION, SOLUTION INTRAVENOUS at 11:56

## 2020-12-03 RX ADMIN — ENOXAPARIN SODIUM 40 MILLIGRAM(S): 100 INJECTION SUBCUTANEOUS at 05:13

## 2020-12-03 RX ADMIN — TRAMADOL HYDROCHLORIDE 25 MILLIGRAM(S): 50 TABLET ORAL at 12:06

## 2020-12-03 RX ADMIN — AMPICILLIN SODIUM AND SULBACTAM SODIUM 200 GRAM(S): 250; 125 INJECTION, POWDER, FOR SUSPENSION INTRAMUSCULAR; INTRAVENOUS at 23:24

## 2020-12-03 RX ADMIN — Medication 10 MILLIGRAM(S): at 22:32

## 2020-12-03 NOTE — PROGRESS NOTE ADULT - ASSESSMENT
54F with PMH of HTN presents to the ED complaining of R submandibular swelling, admitted for possible abscess, found to have [place]'s duct stones. CT did not reveal abscess, pt's stones have passed and pain is slightly improving.    #R submandibular swelling likely due to sialolithiasis  # Numerous Cicero's duct stones up to 6 mm, reactive lymph nodes identified on CT neck  - Pt afebrile, has mild leukocytosis; reports slight improvement in submandibular pain, although continues to have poor PO intake  - C/w IV hydration for now as pt has poor PO intake  - ENT c/s apprec - c/w Decadron 6mg q 8 hrs, IV abx, pain control   - Dental c/s pending, will f/u  - Blood Cx pending  - C/w Unasyn for now    #HTN  - Well controlled   - C/w HCTZ    DVT ppx: Lovenox  GI ppx: Pantoprazole  Diet - carb consistent  Activity - IAT  Full code  Dispo: acute

## 2020-12-03 NOTE — PROGRESS NOTE ADULT - ASSESSMENT
Dental F/U with outpatient dentist for comprehensive dental care.   If any difficulty swallowing/breathing, fever occur, return to ER. 53 y/o female with sialoadenitis and sialolithiasis on the right submandibular gland. Pt seen and examined at bedside. Pt reports passing 4 stones 2 nights ago. Clinically improving.     ·	No acute ENT intervention indicated at this time  ·	Recommend sialogogues  ·	F/u ENT outpt  ·	Dental F/U with outpatient dentist for comprehensive dental care.   ·	If any difficulty swallowing/breathing, fever occur, return to ER.

## 2020-12-03 NOTE — CONSULT NOTE ADULT - SUBJECTIVE AND OBJECTIVE BOX
Patient is a 55y old  Female who presents with a chief complaint of r jaw pain (03 Dec 2020 08:09)      HPI:  54F with PMH of HTN presents to the ED complaining of R submandibular swelling x 2 days.  Pt states that the cap that was implanted over her tooth in her lower R jaw had fallen out approximately 2 weeks ago and she had since tried to make an appointment w/ her dentist to repair her cap/tooth. Pt states that she developed pain R submandibular region 10/10 yesterday and woke up this morning with worsened pain and swelling of the area. Pain radiates to R ear as well. Pt had taken advil yesterday but it has not helped her pain. Pt endorses constant worsening pain in her R submandibular region and R anterior neck, odynophagia that is worsened upon eating/drinking and mild trismus. Pt denies fevers, chills, difficulty tolerating secretions, headache, n/v, dyspnea.    In the ED patient was afebrile, normotensive, sat well on RA. Labs notable for leukocytosis 11.03, hypercalcemia 10.5.     CT neck w/IV cont showed sialolithiasis and postobstructive sialoadenitis of the right submandibular gland with significant surrounding inflammatory changes and edema involving the parapharyngeal, submandibular and carotid spaces. Numerous proximal [place]'s duct stones measure up to 6 mm. Prominent level 1A and right level IIb lymph nodes, likely reactive in nature. Atherosclerotic calcification of the distal right ICA/proximal M1 segment of the MCA    PAST MEDICAL & SURGICAL HISTORY  HTN (hypertension)    SOCIAL HISTORY:  Negative for smoking/alcohol/drug use.     ALLERGIES:  No Known Allergies    MEDICATIONS:  HOME MEDICATIONS    STANDING MEDICATIONS    PRN MEDICATIONS    VITALS:   ICU Vital Signs Last 24 Hrs  T(C): 37.1 (01 Dec 2020 20:32), Max: 37.1 (01 Dec 2020 20:32)  T(F): 98.8 (01 Dec 2020 20:32), Max: 98.8 (01 Dec 2020 20:32)  HR: 98 (01 Dec 2020 20:32) (72 - 98)  BP: 123/71 (01 Dec 2020 20:32) (123/71 - 177/76)  BP(mean): --  ABP: --  ABP(mean): --  RR: 18 (01 Dec 2020 20:32) (18 - 18)  SpO2: 98% (01 Dec 2020 20:32) (98% - 98%)    LABS:                        13.9   11.03 )-----------( 385      ( 01 Dec 2020 12:46 )             43.4         141  |  102  |  14  ----------------------------<  111<H>  4.8   |  27  |  0.7    Ca    10.5<H>      01 Dec 2020 12:46    TPro  8.2<H>  /  Alb  5.0  /  TBili  0.4  /  DBili  x   /  AST  21  /  ALT  22  /  AlkPhos  115      I&O's Detail      Lactate, Blood: 1.0 mmol/L (20 @ 12:46)    PHYSICAL EXAM:  GEN: No acute distress  HEENT: Normocephalic  NECK: R submandibular edema, warm to touch  LUNGS: Decreased breathe sounds  HEART: Regular  ABD: Soft, non-tender, non-distended.  EXT: NE/2+PP  NEURO: AAOX3  PSYCH: Appropriate mood, responds appropriately   (01 Dec 2020 22:31)      PAST MEDICAL & SURGICAL HISTORY:  Left inguinal hernia    HTN (hypertension)    History of 2  sections    History of bilateral ligation of fallopian tubes    History of hysterectomy    MEDICATIONS  (STANDING):  ampicillin/sulbactam  IVPB 3 Gram(s) IV Intermittent every 6 hours  chlorhexidine 4% Liquid 1 Application(s) Topical <User Schedule>  enoxaparin Injectable 40 milliGRAM(s) SubCutaneous at bedtime  hydrochlorothiazide 25 milliGRAM(s) Oral daily  influenza   Vaccine 0.5 milliLiter(s) IntraMuscular once  insulin glargine Injectable (LANTUS) 24 Unit(s) SubCutaneous at bedtime  insulin lispro Injectable (ADMELOG) 8 Unit(s) SubCutaneous three times a day before meals  lactated ringers. 1000 milliLiter(s) (75 mL/Hr) IV Continuous <Continuous>  pantoprazole    Tablet 40 milliGRAM(s) Oral before breakfast  senna 2 Tablet(s) Oral at bedtime    MEDICATIONS  (PRN):  acetaminophen   Tablet .. 650 milliGRAM(s) Oral every 6 hours PRN Temp greater or equal to 38C (100.4F), Mild Pain (1 - 3)  benzocaine 20% Spray 1 Spray(s) Topical four times a day PRN throat pain  ondansetron Injectable 2 milliGRAM(s) IV Push every 6 hours PRN Nausea and/or Vomiting  oxycodone    5 mG/acetaminophen 325 mG 1 Tablet(s) Oral every 6 hours PRN Severe Pain (7 - 10)  polyethylene glycol 3350 17 Gram(s) Oral two times a day PRN Constipation  traMADol 25 milliGRAM(s) Oral every 6 hours PRN Moderate Pain (4 - 6)      Allergies    No Known Allergies    Intolerances        FAMILY HISTORY:  No pertinent family history in first degree relatives        *SOCIAL HISTORY: ( - ) Tobacco; ( - ) ETOH    *Last Dental Visit: Does not remember    Vital Signs Last 24 Hrs  T(C): 36.3 (03 Dec 2020 16:45), Max: 36.8 (02 Dec 2020 21:10)  T(F): 97.3 (03 Dec 2020 16:45), Max: 98.3 (02 Dec 2020 21:10)  HR: 78 (03 Dec 2020 16:45) (75 - 87)  BP: 163/90 (03 Dec 2020 16:45) (123/69 - 179/91)  BP(mean): --  RR: 20 (03 Dec 2020 16:45) (18 - 20)  SpO2: 92% (03 Dec 2020 08:27) (92% - 92%)    LABS:                        13.0   11.57 )-----------( 409      ( 03 Dec 2020 08:48 )             40.6         139  |  101  |  16  ----------------------------<  191<H>  4.3   |  24  |  0.7    Ca    9.7      03 Dec 2020 08:48    TPro  7.5  /  Alb  4.5  /  TBili  0.5  /  DBili  x   /  AST  12  /  ALT  16  /  AlkPhos  104      WBC Count: 11.57 K/uL <H> [4.80 - 10.80] ( @ 08:48)  Platelet Count - Automated: 409 K/uL <H> [130 - 400] ( @ 08:48)  WBC Count: 12.67 K/uL <H> [4.80 - 10.80] ( @ 06:50)  Platelet Count - Automated: 363 K/uL [130 - 400] ( @ 06:50)  Culture Results:   No growth to date. ( @ 06:50)  Culture Results:   <10,000 CFU/mL Normal Urogenital Naomi ( @ 00:35)  WBC Count: 11.03 K/uL <H> [4.80 - 10.80] ( @ 12:46)  Platelet Count - Automated: 385 K/uL [130 - 400] ( @ 12:46)    Urinalysis Basic - ( 02 Dec 2020 00:35 )    Color: Yellow / Appearance: Clear / S.043 / pH: x  Gluc: x / Ketone: Small  / Bili: Negative / Urobili: <2 mg/dL   Blood: x / Protein: 300 mg/dL / Nitrite: Negative   Leuk Esterase: Negative / RBC: 6 /HPF / WBC 5 /HPF   Sq Epi: x / Non Sq Epi: 12 /HPF / Bacteria: Negative          EOE:  TMJ ( - ) clicks                     ( - ) pops                     ( - ) crepitus             Mandible <<FROM>>             Facial bones and MOM <<grossly intact>>             ( - ) trismus             ( + ) lymphadenopathy             (  +) swelling             ( + ) asymmetry             ( + ) palpation             (  + ) dyspnea             ( + ) dysphagia             (  - ) loss of consciousness    IOE:  Permanent dentition: grossly intact           hard/soft palate:  ( - ) palatal torus, <<No pathology noted>>           tongue/FOM - palpable mass.            labial/buccal mucosa <<No pathology noted>>           ( - ) percussion           ( + ) palpation           ( + ) swelling            ( - ) abscess           ( - ) sinus tract    Dentition present: Permanent Dentition    *DENTAL RADIOGRAPHS: Panorex    ASSESSMENT: Palpable mass submandibular area of right side. Firm, very tender for patient to touch. Floor of mouth exam palpable mass tender to touch.      *PLAN: Evaluation with ENT     RECOMMENDATIONS:  1) ENT Evaluation and treatment as needed.   2) Dental F/U with outpatient dentist for comprehensive dental care.   3) If any difficulty swallowing/breathing, fever occur, return to ER.     Maria Fernanda Ascencio

## 2020-12-03 NOTE — PROGRESS NOTE ADULT - ATTENDING COMMENTS
Rebekah Randall MD  Hospitalist   Spectra: [phone]    Patient is a 55y old  Female who presents with a chief complaint of r jaw pain (03 Dec 2020 08:09)      INTERVAL HPI/OVERNIGHT EVENTS: reports pain is over all improving but still gets worse when she attempts to eat.     REVIEW OF SYSTEMS: negative  Vital Signs Last 24 Hrs  T(C): 36.2 (03 Dec 2020 14:57), Max: 36.8 (02 Dec 2020 21:10)  T(F): 97.2 (03 Dec 2020 14:57), Max: 98.3 (02 Dec 2020 21:10)  HR: 75 (03 Dec 2020 14:57) (75 - 87)  BP: 179/91 (03 Dec 2020 14:57) (123/69 - 179/91)  BP(mean): --  RR: 20 (03 Dec 2020 14:57) (18 - 20)  SpO2: 92% (03 Dec 2020 08:27) (92% - 92%)    PHYSICAL EXAM:   NAD; Normocephalic; right submandibular swelling - tenderness over area   LUNGS - no wheezing  HEART: S1 S2+   ABDOMEN: Soft, Nontender, non distended  EXTREMITIES: no cyanosis; no edema  NERVOUS SYSTEM:  Awake and alert; no focal neuro deficits appreciated    LABS:                        13.0   11.57 )-----------( 409      ( 03 Dec 2020 08:48 )             40.6     12-03    139  |  101  |  16  ----------------------------<  191<H>  4.3   |  24  |  0.7    Ca    9.7      03 Dec 2020 08:48    TPro  7.5  /  Alb  4.5  /  TBili  0.5  /  DBili  x   /  AST  12  /  ALT  16  /  AlkPhos  104  12-02      Urinalysis Basic - ( 02 Dec 2020 00:35 )    Color: Yellow / Appearance: Clear / S.043 / pH: x  Gluc: x / Ketone: Small  / Bili: Negative / Urobili: <2 mg/dL   Blood: x / Protein: 300 mg/dL / Nitrite: Negative   Leuk Esterase: Negative / RBC: 6 /HPF / WBC 5 /HPF   Sq Epi: x / Non Sq Epi: 12 /HPF / Bacteria: Negative      CAPILLARY BLOOD GLUCOSE      POCT Blood Glucose.: 200 mg/dL (03 Dec 2020 12:01)      A/P:-  Pt is seen and evaluated by bedside.   1. R submandibular swelling 2/2  sialoadenitis and sialolithiasis  2. HTN     - likely insetting of dehydration   - on gentle hydration and encourage for PO intake   - Ct neck - Numerous [place]'s duct stones up to 6 mm, reactive lymph nodes identified  - ENT eval noted   - discussed with dental and ENT - decadron can be discontinued   - continue antibiotics for now    - Dental eval done this afternoon - follow with recs   - C/w Unasyn  - warm compression and massage over swelling   - BP high this afternoon - likely insetting of steroids and pain   - but otherwise well controlled   - will continue home medication HCTZ     Plan of care was discussed with patient ; all questions and concerns were addressed and care was aligned with patient's wishes.
Rebekah Randall MD  Hospitalist   Spectra: [phone]    Patient is a 54y old  Female who presents with a chief complaint of r jaw pain (02 Dec 2020 08:41)      INTERVAL HPI/OVERNIGHT EVENTS: no acute overnight events noted. passed 4 stones.     REVIEW OF SYSTEMS: negative  Vital Signs Last 24 Hrs  T(C): 37.1 (02 Dec 2020 15:29), Max: 37.8 (01 Dec 2020 23:18)  T(F): 98.8 (02 Dec 2020 15:29), Max: 100 (01 Dec 2020 23:18)  HR: 81 (02 Dec 2020 15:29) (81 - 98)  BP: 124/63 (02 Dec 2020 15:29) (123/71 - 133/74)  BP(mean): --  RR: 20 (02 Dec 2020 15:29) (18 - 20)  SpO2: 93% (02 Dec 2020 08:06) (93% - 98%)    PHYSICAL EXAM:   NAD; Normocephalic; right submandibular swelling and tenderness  LUNGS - no wheezing  HEART: S1 S2+   ABDOMEN: Soft, Nontender, non distended  EXTREMITIES: no cyanosis; no edema  NERVOUS SYSTEM:  Awake and alert; no focal neuro deficits appreciated    LABS:                        12.6   12.67 )-----------( 363      ( 02 Dec 2020 06:50 )             38.9     12-02    139  |  100  |  14  ----------------------------<  185<H>  4.3   |  24  |  0.7    Ca    9.4      02 Dec 2020 06:50    TPro  7.5  /  Alb  4.5  /  TBili  0.5  /  DBili  x   /  AST  12  /  ALT  16  /  AlkPhos  104  12-02      Urinalysis Basic - ( 02 Dec 2020 00:35 )    Color: Yellow / Appearance: Clear / S.043 / pH: x  Gluc: x / Ketone: Small  / Bili: Negative / Urobili: <2 mg/dL   Blood: x / Protein: 300 mg/dL / Nitrite: Negative   Leuk Esterase: Negative / RBC: 6 /HPF / WBC 5 /HPF   Sq Epi: x / Non Sq Epi: 12 /HPF / Bacteria: Negative      CAPILLARY BLOOD GLUCOSE          A/P:-  Pt is seen and evaluated by bedside.   1. R submandibular swelling 2/2  sialoadenitis and sialolithiasis  2. HTN     - likely insetting of dehydration   - on gentle hydration and encourage for PO intake   - Ct neck - Numerous [place]'s duct stones up to 6 mm, reactive lymph nodes identified  - ENT c/s apprec - c/w Decadron 6mg q 8 hrs, IV abx, pain control   - Dental c/s pending  - C/w Unasyn  - warm compression and massage over swelling   - BP controlled with home regimen.     Plan of care was discussed with patient ; all questions and concerns were addressed and care was aligned with patient's wishes.

## 2020-12-03 NOTE — PROGRESS NOTE ADULT - SUBJECTIVE AND OBJECTIVE BOX
ENT:  53 y/o female with sialoadenitis and sialolithiasis on the right submandibular gland. Pt seen and examined at bedside. Pt reports passing 4 stones 2 nights ago and had some subsequent bleeding which resolved on its own. Continues to have considerable jaw discomfort on the right. Denies fevers, chills, N/V.     [ x ] A 10 Point Review of Systems was negative except where noted.  [    ] Due to altered mental status/intubation, subjective information was not able to be obtained from the patient. History was obtained to the extent possible from the review of the chart and collateral sources of information.    Vital Signs Last 24 Hrs  T(C): 36.3 (03 Dec 2020 16:45), Max: 36.8 (02 Dec 2020 21:10)  T(F): 97.3 (03 Dec 2020 16:45), Max: 98.3 (02 Dec 2020 21:10)  HR: 78 (03 Dec 2020 16:45) (75 - 87)  BP: 163/90 (03 Dec 2020 16:45) (123/69 - 179/91)  BP(mean): --  RR: 20 (03 Dec 2020 16:45) (18 - 20)  SpO2: 92% (03 Dec 2020 08:27) (92% - 92%)    PHYSICAL EXAM:  Gen: NAD, well-developed  Skin: Good color  HEENT: Head NC/AT. B/l EACs clear, TMs intact. Nares bilaterally patent, no blood or discharge noted. Oral cavity no erythema/edema. Tongue wnl. Uvula midline. Posterior oropharynx clear, no discharge/blood noted.   Neck: Flat, supple, no lymphadenopathy, trachea midline  Resp: breathing easily, no stridor  CV: no peripheral edema/cyanosis  Abd: soft, nontender  Ext: HULL x 4    LABS:                        13.0   11.57 )-----------( 409      ( 03 Dec 2020 08:48 )             40.6       IMAGING/ADDITIONAL STUDIES: ENT:  55 y/o female with sialoadenitis and sialolithiasis on the right submandibular gland. Pt seen and examined at bedside. Pt reports passing 4 stones 2 nights ago and had some subsequent bleeding which resolved on its own. Continues to have considerable jaw discomfort on the right. Denies fevers, chills, N/V.     [ x ] A 10 Point Review of Systems was negative except where noted.  [    ] Due to altered mental status/intubation, subjective information was not able to be obtained from the patient. History was obtained to the extent possible from the review of the chart and collateral sources of information.    Vital Signs Last 24 Hrs  T(C): 36.3 (03 Dec 2020 16:45), Max: 36.8 (02 Dec 2020 21:10)  T(F): 97.3 (03 Dec 2020 16:45), Max: 98.3 (02 Dec 2020 21:10)  HR: 78 (03 Dec 2020 16:45) (75 - 87)  BP: 163/90 (03 Dec 2020 16:45) (123/69 - 179/91)  RR: 20 (03 Dec 2020 16:45) (18 - 20)  SpO2: 92% (03 Dec 2020 08:27) (92% - 92%)    PHYSICAL EXAM:  Gen: NAD, well-developed  Skin: Good color  HEENT: Head NC/AT. Nares bilaterally patent, no blood or discharge noted. Oral cavity no erythema/edema. Tongue wnl. Uvula midline. Posterior oropharynx clear, no discharge/blood noted. No TTP to the floor of mouth or buccal mucosa b/l,  +TTP and swelling to the R submandibular region and R anterior neck,  +TTP to tooth #31,  Neck: trachea midline  Resp: breathing easily, no stridor  CV: no peripheral edema/cyanosis  Abd: soft, nontender  Ext: HULL x 4    LABS:                        13.0   11.57 )-----------( 409      ( 03 Dec 2020 08:48 )             40.6       IMAGING/ADDITIONAL STUDIES:  < from: CT Neck Soft Tissue w/ IV Cont (12.01.20 @ 14:00) >    EXAM:  CT NECK SOFT TISSUE IC            PROCEDURE DATE:  12/01/2020            INTERPRETATION:  INDICATIONS:  Right-sided dental and facial pain    TECHNIQUE:   Axial images were obtained following the intravenous administration of contrast material. Sagittal and coronal reformatted images were obtained. 100 cc Omnipaque 350 were administered.  0. Cc were discarded.    COMPARISON EXAMINATION:  None.    FINDINGS:    SUBMANDIBULAR GLANDS: There are multiple sialoliths noted in the proximal rightWharton's duct measuring up to 6 mm which extend into the submandibular gland. There is significant enlargement and stranding surrounding the right submandibular gland. There is significant edema noted in the right parapharyngeal, carotid and submandibular spaces. There is no evidence of abscess. There is significant swelling noted in the right platysma with surrounding edema.    The left submandibular gland is unremarkable    AERODIGESTIVE TRACT:  There is no abnormal nodular or masslike enhancement. There is mild submucosal edema noted involving the right oropharynx..    LYMPH NODES:  There are prominent though nonenlarged level 1A lymph nodes measuring up to 1.0 cm.    There are multiple enlarged right level 2B lymph nodes measuring up to 1.6 x 1.2 cm    PAROTID GLANDS:  Normal.    THYROID GLAND:  Normal.    VISUALIZED PARANASAL SINUSES:  Clear.    VISUALIZED TYMPANOMASTOID CAVITIES: Clear.    BONES:  There are hemangiomas noted of the T1 and T3 vertebral bodies..    MISCELLANEOUS:  There is atherosclerotic calcification involving the distal right ICA and proximal M1 segment of the MCA..      IMPRESSION:  Findings consistent with sialolithiasis and postobstructive sialoadenitis of the right submandibular gland with significant surrounding inflammatory changes and edema involving the parapharyngeal, submandibular and carotid spaces. Numerous proximal [address]'s duct stones measure up to 6 mm.    Prominent level 1A and right level IIb lymph nodes, likely reactive in nature.    Atherosclerotic calcification of the distal right ICA/proximal M1 segment of the MCA.              HANNAH CHOUDHARY M.D., ATTENDING RADIOLOGIST  This document has been electronically signed. Dec  1 2020  2:24PM    < end of copied text >

## 2020-12-03 NOTE — PROGRESS NOTE ADULT - SUBJECTIVE AND OBJECTIVE BOX
Patient Information:  MASOUD FRANCISCO / 55y / Female / MRN#:[id number]    Hospital Day: 2d    Interval History:  Patient seen and examined at bedside. No acute events overnight.  Pt reports she feels better today, reports slight improvement in submandibular pain. Has trouble eating and drinking due to poor appetite and pain.  Pt was ruled out for COVID as she had potential exposure yesterday. COVID neg .      Past Medical History:  Left inguinal hernia    HTN (hypertension)    No pertinent past medical history      Past Surgical History:  History of 2  sections    History of bilateral ligation of fallopian tubes    History of hysterectomy      Allergies:  No Known Allergies    Medications:  PRN:  acetaminophen   Tablet .. 650 milliGRAM(s) Oral every 6 hours PRN Temp greater or equal to 38C (100.4F), Mild Pain (1 - 3)  benzocaine 20% Spray 1 Spray(s) Topical four times a day PRN throat pain  ondansetron Injectable 2 milliGRAM(s) IV Push every 6 hours PRN Nausea and/or Vomiting  oxycodone    5 mG/acetaminophen 325 mG 1 Tablet(s) Oral every 6 hours PRN Severe Pain (7 - 10)  traMADol 25 milliGRAM(s) Oral every 6 hours PRN Moderate Pain (4 - 6)    Standing:  ampicillin/sulbactam  IVPB 3 Gram(s) IV Intermittent every 6 hours  chlorhexidine 4% Liquid 1 Application(s) Topical <User Schedule>  dexAMETHasone  Injectable 6 milliGRAM(s) IV Push every 8 hours  enoxaparin Injectable 40 milliGRAM(s) SubCutaneous every 12 hours  hydrochlorothiazide 25 milliGRAM(s) Oral daily  influenza   Vaccine 0.5 milliLiter(s) IntraMuscular once  lactated ringers. 1000 milliLiter(s) (75 mL/Hr) IV Continuous <Continuous>  pantoprazole    Tablet 40 milliGRAM(s) Oral before breakfast    Home:  hydroCHLOROthiazide 25 mg oral tablet: 1 tab(s) orally once a day    Vitals:  T(C): 36.1, Max: 37.1 (20 @ 15:29)  T(F): 96.9, Max: 98.8 (20 @ 15:29)  HR: 82 (81 - 87)  BP: 129/70 (123/69 - 129/70)  RR: 20 (18 - 20)  SpO2: --    Physical Exam:  General: Awake, alert, NAD, resting comfortably in bed, on RA  HEENT: Normal appearing oral mucosa, R sided sub mandibular swelling, tenderness - improved  Heart: RRR; S1/S2; no rubs, murmurs appreciated  Lungs: Clear to auscultation bilaterally, no wheezing, rales or rhonchi  Abdomen: Soft, nontender, nondistended, BS+  Extremities: No edema, clubbing, cyanosis in upper or lower extremities  Neuro: AAOx3, NFD    Labs:  CBC ( @ 06:50)                        Hgb: 12.6   WBC: 12.67 )-----------------( Plts: 363                              Hct: 38.9     Chem ( @ 06:50)  Na: 139  |     Cl: 100     |  BUN: 14  -----------------------------------------< Gluc: 185    K: 4.3   |    HCO3: 24  |  Cr: 0.7    Ca 9.4 ( @ 06:50)    LFTs ( @ 06:50)  TPro 7.5  /  Alb 4.5  TBili 0.5  /  DBili     AST 12  /  ALT 16  /  AlkPhos 104    Cardiac Markers ( @ 06:50)  Troponin I X  Troponin T X  CK X  CKMB X  CKMB Units X  Myoglobin X  Lactate 1.2  ESR X    Cardiac Markers ( @ 12:46)  Troponin I X  Troponin T X  CK X  CKMB X  CKMB Units X  Myoglobin X  Lactate 1.0  ESR X          Urinalysis Basic ( @ 00:35)  Color: Yellow  Appearance: Clear  S.043  pH:   Gluc:   Ketone: Small  Bili: Negative  Urobili: <2 mg/dL   Blood:   Protein: 300 mg/dL  Nitrite: Negative   Leuk Esterase: Negative  RBC: 6  WBC: 5   Sq Epi:   Non Sq Epi: 12  Bacteria: Negative    Microbiology:  Culture - Urine (collected  @ 00:35)  Source: .Urine Clean Catch (Midstream)  Final Report ( @ 02:47):    <10,000 CFU/mL Normal Urogenital Naomi        Radiology:

## 2020-12-04 VITALS — OXYGEN SATURATION: 96 %

## 2020-12-04 LAB
ANION GAP SERPL CALC-SCNC: 12 MMOL/L — SIGNIFICANT CHANGE UP (ref 7–14)
BASOPHILS # BLD AUTO: 0.01 K/UL — SIGNIFICANT CHANGE UP (ref 0–0.2)
BASOPHILS NFR BLD AUTO: 0.1 % — SIGNIFICANT CHANGE UP (ref 0–1)
BUN SERPL-MCNC: 17 MG/DL — SIGNIFICANT CHANGE UP (ref 10–20)
CALCIUM SERPL-MCNC: 9.4 MG/DL — SIGNIFICANT CHANGE UP (ref 8.5–10.1)
CHLORIDE SERPL-SCNC: 101 MMOL/L — SIGNIFICANT CHANGE UP (ref 98–110)
CO2 SERPL-SCNC: 26 MMOL/L — SIGNIFICANT CHANGE UP (ref 17–32)
CREAT SERPL-MCNC: 0.6 MG/DL — LOW (ref 0.7–1.5)
EOSINOPHIL # BLD AUTO: 0 K/UL — SIGNIFICANT CHANGE UP (ref 0–0.7)
EOSINOPHIL NFR BLD AUTO: 0 % — SIGNIFICANT CHANGE UP (ref 0–8)
GLUCOSE BLDC GLUCOMTR-MCNC: 146 MG/DL — HIGH (ref 70–99)
GLUCOSE SERPL-MCNC: 143 MG/DL — HIGH (ref 70–99)
HCT VFR BLD CALC: 36.7 % — LOW (ref 37–47)
HGB BLD-MCNC: 11.7 G/DL — LOW (ref 12–16)
IMM GRANULOCYTES NFR BLD AUTO: 0.7 % — HIGH (ref 0.1–0.3)
LYMPHOCYTES # BLD AUTO: 1.85 K/UL — SIGNIFICANT CHANGE UP (ref 1.2–3.4)
LYMPHOCYTES # BLD AUTO: 18.4 % — LOW (ref 20.5–51.1)
MCHC RBC-ENTMCNC: 26.4 PG — LOW (ref 27–31)
MCHC RBC-ENTMCNC: 31.9 G/DL — LOW (ref 32–37)
MCV RBC AUTO: 82.7 FL — SIGNIFICANT CHANGE UP (ref 81–99)
MONOCYTES # BLD AUTO: 0.67 K/UL — HIGH (ref 0.1–0.6)
MONOCYTES NFR BLD AUTO: 6.7 % — SIGNIFICANT CHANGE UP (ref 1.7–9.3)
NEUTROPHILS # BLD AUTO: 7.44 K/UL — HIGH (ref 1.4–6.5)
NEUTROPHILS NFR BLD AUTO: 74.1 % — SIGNIFICANT CHANGE UP (ref 42.2–75.2)
NRBC # BLD: 0 /100 WBCS — SIGNIFICANT CHANGE UP (ref 0–0)
PLATELET # BLD AUTO: 388 K/UL — SIGNIFICANT CHANGE UP (ref 130–400)
POTASSIUM SERPL-MCNC: 4.1 MMOL/L — SIGNIFICANT CHANGE UP (ref 3.5–5)
POTASSIUM SERPL-SCNC: 4.1 MMOL/L — SIGNIFICANT CHANGE UP (ref 3.5–5)
RBC # BLD: 4.44 M/UL — SIGNIFICANT CHANGE UP (ref 4.2–5.4)
RBC # FLD: 14.6 % — HIGH (ref 11.5–14.5)
SODIUM SERPL-SCNC: 139 MMOL/L — SIGNIFICANT CHANGE UP (ref 135–146)
WBC # BLD: 10.04 K/UL — SIGNIFICANT CHANGE UP (ref 4.8–10.8)
WBC # FLD AUTO: 10.04 K/UL — SIGNIFICANT CHANGE UP (ref 4.8–10.8)

## 2020-12-04 PROCEDURE — 99233 SBSQ HOSP IP/OBS HIGH 50: CPT | Mod: GC

## 2020-12-04 RX ORDER — TRAMADOL HYDROCHLORIDE 50 MG/1
0.5 TABLET ORAL
Qty: 10 | Refills: 0
Start: 2020-12-04 | End: 2020-12-08

## 2020-12-04 RX ADMIN — Medication 25 MILLIGRAM(S): at 06:58

## 2020-12-04 RX ADMIN — OXYCODONE AND ACETAMINOPHEN 1 TABLET(S): 5; 325 TABLET ORAL at 07:30

## 2020-12-04 RX ADMIN — Medication 8 UNIT(S): at 08:52

## 2020-12-04 RX ADMIN — Medication 30 MILLILITER(S): at 03:51

## 2020-12-04 RX ADMIN — PANTOPRAZOLE SODIUM 40 MILLIGRAM(S): 20 TABLET, DELAYED RELEASE ORAL at 06:58

## 2020-12-04 NOTE — DISCHARGE NOTE PROVIDER - HOSPITAL COURSE
54F with PMH of HTN presents to the ED complaining of R submandibular swelling x 2 days.  Pt states that the cap that was implanted over her tooth in her lower R jaw had fallen out approximately 2 weeks ago and she had since tried to make an appointment w/ her dentist to repair her cap/tooth. Pt states that she developed pain R submandibular region 10/10 yesterday and woke up this morning with worsened pain and swelling of the area. Pain radiates to R ear as well. Pt had taken advil yesterday but it has not helped her pain. Pt endorses constant worsening pain in her R submandibular region and R anterior neck, odynophagia that is worsened upon eating/drinking and mild trismus. Pt denies fevers, chills, difficulty tolerating secretions, headache, n/v, dyspnea.  In the ED patient was afebrile, normotensive, sat well on RA. Labs notable for leukocytosis 11.03, hypercalcemia 10.5.   CT neck w/IV cont showed sialolithiasis and postobstructive sialoadenitis of the right submandibular gland with significant surrounding inflammatory changes and edema involving the parapharyngeal, submandibular and carotid spaces. Numerous proximal Maurilio's duct stones measure up to 6 mm. Prominent level 1A and right level IIb lymph nodes, likely reactive in nature. Atherosclerotic calcification of the distal right ICA/proximal M1 segment of the MCA. Pt seen by ENT, recommended to continue with IV ABx, IV steroids. Dental saw pt, recommend outpt f/u. Pt passed 4-5 stones and symptoms of pain improved, now tolerating more PO intake. Stable for d/c home.

## 2020-12-04 NOTE — DISCHARGE NOTE PROVIDER - NSDCCPCAREPLAN_GEN_ALL_CORE_FT
PRINCIPAL DISCHARGE DIAGNOSIS  Diagnosis: Sialolithiasis  Assessment and Plan of Treatment: You presented to the hospital with severe pain in your jaw area and were found to have salivary gland stones. There was no infection visualized on CT scan of the neck. [place], there was inflammatory changes. ENT evaluated you and recommend that you follow up with them as an outpt. You have been improving and are stable for discharge home. Be sure to follow up with your dentist as well.      SECONDARY DISCHARGE DIAGNOSES  Diagnosis: Exposure to COVID-19 virus  Assessment and Plan of Treatment:      PRINCIPAL DISCHARGE DIAGNOSIS  Diagnosis: Sialolithiasis  Assessment and Plan of Treatment: You presented to the hospital with severe pain in your jaw area and were found to have salivary gland stones. There was no infection visualized on CT scan of the neck. [place], there was inflammatory changes. ENT evaluated you and recommend that you follow up with them as an outpt. You have been improving and are stable for discharge home. Be sure to follow up with your dentist as well.      SECONDARY DISCHARGE DIAGNOSES  Diagnosis: Exposure to COVID-19 virus  Assessment and Plan of Treatment: You were exposed to COVID, however you tested negative and have not been displaying any symptoms of infection.   We recommend that you quarantine for two weeks in total starting from 12/3.

## 2020-12-04 NOTE — DISCHARGE NOTE PROVIDER - ATTENDING COMMENTS
A/P:-  Pt is seen and evaluated by bedside.   1. R submandibular swelling 2/2  sialoadenitis and sialolithiasis  2. HTN     - likely insetting of dehydration   - on gentle hydration and encourage for PO intake   - Ct neck - Numerous Jermyn's duct stones up to 6 mm, reactive lymph nodes identified  - ENT and dental eval noted   - dc antibiotics   - warm compression and massage over swelling   - BP high this afternoon - likely insetting of steroids and pain   - but otherwise well controlled   - will continue home medication HCTZ     patient is hemodynamically stable and ready for discharge.    Plan of care was discussed with patient ; all questions and concerns were addressed and care was aligned with patient's wishes.

## 2020-12-04 NOTE — DISCHARGE NOTE PROVIDER - NSDCPNSUBOBJ_GEN_ALL_CORE
Rebkeah Randall MD  Hospitalist   Spectra: [phone]    Patient is a 55y old  Female who presents with a chief complaint of r jaw pain (04 Dec 2020 08:15)      INTERVAL HPI/OVERNIGHT EVENTS: reports improvement in pain     REVIEW OF SYSTEMS: negative  Vital Signs Last 24 Hrs  T(C): 36.2 (04 Dec 2020 04:46), Max: 36.4 (03 Dec 2020 21:02)  T(F): 97.1 (04 Dec 2020 04:46), Max: 97.5 (03 Dec 2020 21:02)  HR: 61 (04 Dec 2020 04:46) (61 - 78)  BP: 165/90 (04 Dec 2020 04:46) (163/81 - 182/96)  BP(mean): --  RR: 18 (04 Dec 2020 04:46) (18 - 20)  SpO2: 96% (04 Dec 2020 08:52) (96% - 97%)    PHYSICAL EXAM:   NAD; Normocephalic; right submandibular swelling   LUNGS - no wheezing  HEART: S1 S2+   ABDOMEN: Soft, Nontender, non distended  EXTREMITIES: no cyanosis; no edema  NERVOUS SYSTEM:  Awake and alert; no focal neuro deficits appreciated    LABS:                        11.7   10.04 )-----------( 388      ( 04 Dec 2020 07:36 )             36.7     12-04    139  |  101  |  17  ----------------------------<  143<H>  4.1   |  26  |  0.6<L>    Ca    9.4      04 Dec 2020 07:36          CAPILLARY BLOOD GLUCOSE      POCT Blood Glucose.: 146 mg/dL (04 Dec 2020 08:04)  POCT Blood Glucose.: 175 mg/dL (03 Dec 2020 21:10)  POCT Blood Glucose.: 217 mg/dL (03 Dec 2020 16:34)

## 2020-12-04 NOTE — DISCHARGE NOTE NURSING/CASE MANAGEMENT/SOCIAL WORK - PATIENT PORTAL LINK FT
You can access the FollowMyHealth Patient Portal offered by [facility] by registering at the following website: [email or web address]. By joining [employer]’s FollowMyHealth portal, you will also be able to view your health information using other applications (apps) compatible with our system.

## 2020-12-04 NOTE — DISCHARGE NOTE PROVIDER - CARE PROVIDER_API CALL
Mariann Malik  OTOLARYNGOLOGY  [address] [place] [address], [address] [place] [address]  Phone: [phone]  Fax: [phone]  Follow Up Time: 1 week

## 2020-12-04 NOTE — CHART NOTE - NSCHARTNOTEFT_GEN_A_CORE
<<<RESIDENT DISCHARGE NOTE>>>     MASOUD FRANCISCO  MRN-[id number]    VITAL SIGNS:  T(F): 97.1 (12-04-20 @ 04:46), Max: 97.5 (12-03-20 @ 21:02)  HR: 61 (12-04-20 @ 04:46)  BP: 165/90 (12-04-20 @ 04:46)  SpO2: 96% (12-04-20 @ 08:52)      PHYSICAL EXAMINATION:  General: NAD, appears comfortable  Head & Neck: Decreased R sided submandibular swelling, tender  Pulmonary: CTAB  Cardiovascular: RRR, normal S1, S2  Gastrointestinal/Abdomen & Pelvis: Nontender, nondistended, BS+  Neurologic/Motor: AAOX4, NFD    TEST RESULTS:                        11.7   10.04 )-----------( 388      ( 04 Dec 2020 07:36 )             36.7       12-04    139  |  101  |  17  ----------------------------<  143<H>  4.1   |  26  |  0.6<L>    Ca    9.4      04 Dec 2020 07:36        FINAL DISCHARGE INTERVIEW:  Resident(s) Present: Macy Horn    DISCHARGE MEDICATION RECONCILIATION  reviewed with Attending: Dr. Randall    DISPOSITION:   [ x ] Home,    [  ] Home with Visiting Nursing Services,   [    ]  SNF/ NH,    [   ] Acute Rehab (4A),   [   ] Other (Specify:_________)

## 2020-12-07 LAB
CULTURE RESULTS: SIGNIFICANT CHANGE UP
SPECIMEN SOURCE: SIGNIFICANT CHANGE UP

## 2020-12-08 DIAGNOSIS — K11.5 SIALOLITHIASIS: ICD-10-CM

## 2020-12-08 DIAGNOSIS — D72.829 ELEVATED WHITE BLOOD CELL COUNT, UNSPECIFIED: ICD-10-CM

## 2020-12-08 DIAGNOSIS — R13.10 DYSPHAGIA, UNSPECIFIED: ICD-10-CM

## 2020-12-08 DIAGNOSIS — R63.8 OTHER SYMPTOMS AND SIGNS CONCERNING FOOD AND FLUID INTAKE: ICD-10-CM

## 2020-12-08 DIAGNOSIS — I10 ESSENTIAL (PRIMARY) HYPERTENSION: ICD-10-CM

## 2020-12-08 DIAGNOSIS — R73.9 HYPERGLYCEMIA, UNSPECIFIED: ICD-10-CM

## 2020-12-08 DIAGNOSIS — Z20.828 CONTACT WITH AND (SUSPECTED) EXPOSURE TO OTHER VIRAL COMMUNICABLE DISEASES: ICD-10-CM

## 2020-12-08 DIAGNOSIS — E86.0 DEHYDRATION: ICD-10-CM

## 2020-12-08 DIAGNOSIS — R22.0 LOCALIZED SWELLING, MASS AND LUMP, HEAD: ICD-10-CM

## 2020-12-08 DIAGNOSIS — E83.52 HYPERCALCEMIA: ICD-10-CM

## 2020-12-08 DIAGNOSIS — R25.2 CRAMP AND SPASM: ICD-10-CM

## 2020-12-08 DIAGNOSIS — K11.20 SIALOADENITIS, UNSPECIFIED: ICD-10-CM

## 2020-12-08 DIAGNOSIS — K12.2 CELLULITIS AND ABSCESS OF MOUTH: ICD-10-CM

## 2021-09-21 ENCOUNTER — OUTPATIENT (OUTPATIENT)
Dept: OUTPATIENT SERVICES | Facility: HOSPITAL | Age: 56
LOS: 1 days | Discharge: HOME | End: 2021-09-21

## 2021-09-21 DIAGNOSIS — Z98.891 HISTORY OF UTERINE SCAR FROM PREVIOUS SURGERY: Chronic | ICD-10-CM

## 2021-09-21 DIAGNOSIS — Z90.710 ACQUIRED ABSENCE OF BOTH CERVIX AND UTERUS: Chronic | ICD-10-CM

## 2021-09-21 DIAGNOSIS — Z98.51 TUBAL LIGATION STATUS: Chronic | ICD-10-CM

## 2021-09-21 PROBLEM — I10 ESSENTIAL (PRIMARY) HYPERTENSION: Chronic | Status: ACTIVE | Noted: 2020-12-01

## 2021-09-21 PROBLEM — K40.90 UNILATERAL INGUINAL HERNIA, WITHOUT OBSTRUCTION OR GANGRENE, NOT SPECIFIED AS RECURRENT: Chronic | Status: ACTIVE | Noted: 2020-12-01

## 2021-09-29 ENCOUNTER — OUTPATIENT (OUTPATIENT)
Dept: OUTPATIENT SERVICES | Facility: HOSPITAL | Age: 56
LOS: 1 days | Discharge: HOME | End: 2021-09-29

## 2021-09-29 DIAGNOSIS — Z98.51 TUBAL LIGATION STATUS: Chronic | ICD-10-CM

## 2021-09-29 DIAGNOSIS — Z98.891 HISTORY OF UTERINE SCAR FROM PREVIOUS SURGERY: Chronic | ICD-10-CM

## 2021-09-29 DIAGNOSIS — Z90.710 ACQUIRED ABSENCE OF BOTH CERVIX AND UTERUS: Chronic | ICD-10-CM

## 2021-11-23 ENCOUNTER — OUTPATIENT (OUTPATIENT)
Dept: OUTPATIENT SERVICES | Facility: HOSPITAL | Age: 56
LOS: 1 days | End: 2021-11-23

## 2021-11-23 DIAGNOSIS — Z90.710 ACQUIRED ABSENCE OF BOTH CERVIX AND UTERUS: Chronic | ICD-10-CM

## 2021-11-23 DIAGNOSIS — K02.63 DENTAL CARIES ON SMOOTH SURFACE PENETRATING INTO PULP: ICD-10-CM

## 2021-11-23 DIAGNOSIS — Z98.891 HISTORY OF UTERINE SCAR FROM PREVIOUS SURGERY: Chronic | ICD-10-CM

## 2021-11-23 DIAGNOSIS — Z98.51 TUBAL LIGATION STATUS: Chronic | ICD-10-CM

## 2021-12-14 NOTE — DISCHARGE NOTE PROVIDER - NSDCMRMEDTOKEN_GEN_ALL_CORE_FT
hydroCHLOROthiazide 25 mg oral tablet: 1 tab(s) orally once a day   hydroCHLOROthiazide 25 mg oral tablet: 1 tab(s) orally once a day  oxycodone-acetaminophen 5 mg-325 mg oral tablet: 1 tab(s) orally every 6 hours, As needed, Severe Pain (7 - 10) MDD:4 tab/day  traMADol 50 mg oral tablet: 0.5 tab(s) orally every 6 hours, As needed, Moderate Pain (4 - 6) MDD:2 tab/day   Mirvaso Pregnancy And Lactation Text: This medication has not been assigned a Pregnancy Risk Category. It is unknown if the medication is excreted in breast milk.

## 2022-04-19 ENCOUNTER — OUTPATIENT (OUTPATIENT)
Dept: OUTPATIENT SERVICES | Facility: HOSPITAL | Age: 57
LOS: 1 days | Discharge: HOME | End: 2022-04-19

## 2022-04-19 DIAGNOSIS — Z98.51 TUBAL LIGATION STATUS: Chronic | ICD-10-CM

## 2022-04-19 DIAGNOSIS — Z90.710 ACQUIRED ABSENCE OF BOTH CERVIX AND UTERUS: Chronic | ICD-10-CM

## 2022-04-19 DIAGNOSIS — Z98.891 HISTORY OF UTERINE SCAR FROM PREVIOUS SURGERY: Chronic | ICD-10-CM

## 2022-04-27 DIAGNOSIS — Z01.21 ENCOUNTER FOR DENTAL EXAMINATION AND CLEANING WITH ABNORMAL FINDINGS: ICD-10-CM

## 2022-12-02 ENCOUNTER — EMERGENCY (EMERGENCY)
Facility: HOSPITAL | Age: 57
LOS: 0 days | Discharge: HOME | End: 2022-12-02
Attending: STUDENT IN AN ORGANIZED HEALTH CARE EDUCATION/TRAINING PROGRAM | Admitting: STUDENT IN AN ORGANIZED HEALTH CARE EDUCATION/TRAINING PROGRAM

## 2022-12-02 VITALS
DIASTOLIC BLOOD PRESSURE: 90 MMHG | HEART RATE: 87 BPM | SYSTOLIC BLOOD PRESSURE: 179 MMHG | RESPIRATION RATE: 20 BRPM | OXYGEN SATURATION: 97 % | TEMPERATURE: 98 F

## 2022-12-02 DIAGNOSIS — I10 ESSENTIAL (PRIMARY) HYPERTENSION: ICD-10-CM

## 2022-12-02 DIAGNOSIS — Z90.49 ACQUIRED ABSENCE OF OTHER SPECIFIED PARTS OF DIGESTIVE TRACT: ICD-10-CM

## 2022-12-02 DIAGNOSIS — Z98.51 TUBAL LIGATION STATUS: ICD-10-CM

## 2022-12-02 DIAGNOSIS — K08.89 OTHER SPECIFIED DISORDERS OF TEETH AND SUPPORTING STRUCTURES: ICD-10-CM

## 2022-12-02 DIAGNOSIS — Z98.891 HISTORY OF UTERINE SCAR FROM PREVIOUS SURGERY: Chronic | ICD-10-CM

## 2022-12-02 DIAGNOSIS — Z98.51 TUBAL LIGATION STATUS: Chronic | ICD-10-CM

## 2022-12-02 DIAGNOSIS — K02.9 DENTAL CARIES, UNSPECIFIED: ICD-10-CM

## 2022-12-02 DIAGNOSIS — Z90.710 ACQUIRED ABSENCE OF BOTH CERVIX AND UTERUS: Chronic | ICD-10-CM

## 2022-12-02 PROCEDURE — 99284 EMERGENCY DEPT VISIT MOD MDM: CPT

## 2022-12-02 RX ORDER — AMOXICILLIN 250 MG/5ML
1 SUSPENSION, RECONSTITUTED, ORAL (ML) ORAL
Qty: 20 | Refills: 0
Start: 2022-12-02 | End: 2022-12-11

## 2022-12-02 RX ORDER — IBUPROFEN 200 MG
1 TABLET ORAL
Qty: 20 | Refills: 0
Start: 2022-12-02 | End: 2022-12-06

## 2022-12-02 RX ORDER — IBUPROFEN 200 MG
600 TABLET ORAL ONCE
Refills: 0 | Status: COMPLETED | OUTPATIENT
Start: 2022-12-02 | End: 2022-12-02

## 2022-12-02 RX ADMIN — Medication 600 MILLIGRAM(S): at 18:35

## 2022-12-02 NOTE — ED PROVIDER NOTE - PATIENT PORTAL LINK FT
You can access the FollowMyHealth Patient Portal offered by [facility] by registering at the following website: [email or web address]. By joining [employer]’s FollowMyHealth portal, you will also be able to view your health information using other applications (apps) compatible with our system.

## 2022-12-02 NOTE — ED PROVIDER NOTE - CLINICAL SUMMARY MEDICAL DECISION MAKING FREE TEXT BOX
dental pain # 12 x 2 days. no fevers. cavity on exam, no abscess. airway intact. neck supple, no submandibular erythema. no ludwigs. dental eval for dental block. abx sent to pharmacy. follow up with dental for comprehensive care.

## 2022-12-02 NOTE — ED PROVIDER NOTE - NS ED ATTENDING STATEMENT MOD
This was a shared visit with the DOTTY. I reviewed and verified the documentation and independently performed the documented:

## 2022-12-02 NOTE — ED PROVIDER NOTE - NS ED ROS FT
Constitutional: Negative for fever  Mouth: + toothache.   Cardiovascular: Negative for chest pain  Respiratory: Negative for SOB,

## 2022-12-02 NOTE — ED PROVIDER NOTE - PROGRESS NOTE DETAILS
Pt has been seen by dental and dental block given. Pt is stable for discharge. Abx and ibuprofen sent to pharmacy.

## 2022-12-02 NOTE — CONSULT NOTE ADULT - ASSESSMENT
Patient is a 56y old  Female who presents with a chief complaint of     HPI: Upper left dental pain since yesterday that got worse this morning      PAST MEDICAL & SURGICAL HISTORY:  HTN (hypertension)      Left inguinal hernia      History of hysterectomy      History of bilateral ligation of fallopian tubes      History of 2  sections        ( - ) heart valve replacement  ( - ) joint replacement  ( - ) pregnancy    MEDICATIONS  (STANDING):    MEDICATIONS  (PRN):      Allergies    No Known Allergies    Intolerances        FAMILY HISTORY:  No pertinent family history in first degree relatives        *Last Dental Visit:    Vital Signs Last 24 Hrs  T(C): 36.6 (02 Dec 2022 17:32), Max: 36.6 (02 Dec 2022 17:32)  T(F): 97.9 (02 Dec 2022 17:32), Max: 97.9 (02 Dec 2022 17:32)  HR: 87 (02 Dec 2022 17:32) (87 - 87)  BP: 179/90 (02 Dec 2022 17:32) (179/90 - 179/90)  BP(mean): --  RR: 20 (02 Dec 2022 17:32) (20 - 20)  SpO2: 97% (02 Dec 2022 17:32) (97% - 97%)    Parameters below as of 02 Dec 2022 17:32  Patient On (Oxygen Delivery Method): room air        LABS:                  EOE:  TMJ ( - ) clicks                     ( - ) pops                     ( - ) crepitus             Mandible <<FROM>>             Facial bones and MOM <<grossly intact>>             ( - ) trismus             ( - ) lymphadenopathy             ( - ) swelling             ( - ) asymmetry             ( - ) palpation             ( - ) dyspnea             ( - ) dysphagia             ( - ) loss of consciousness    IOE:  <<permanent>> dentition: <multiple carious teeth>>            hard/soft palate: <<No pathology noted>>           tongue/FOM <<No pathology noted>>           labial/buccal mucosa <<No pathology noted>>           ( + ) percussion           ( - ) palpation           ( - ) swelling            ( - ) abscess           ( - ) sinus tract    Dentition present: <<y>>  Mobility: <<n>>  Caries: << y>>         *DENTAL RADIOGRAPHS: None taken    RADIOLOGY & ADDITIONAL STUDIES:    *ASSESSMENT: Nonrestorable tooth #12, advanced caries. Patient reported temporary crown dislodged and #12 demonstrated large cavitation.      *PLAN: No emergent dental care needed at this time. Administer anesthetic to alleviate symptoms. Continue analgesics as necessary for the pain.    PROCEDURE:   Verbal and written consent given.  Anesthesia: 1 carpule of 0.5% Bupivacaine (1:200,000 epinephrine) via buccal infiltration  Treatment: Limited Oral Examination    RECOMMENDATIONS:  1) OTC Analgesics as necessary for pain  2) Dental F/U with outpatient dentist for comprehensive dental care or [facility] dental clinic   3) If any difficulty swallowing/breathing, fever occur, return to ER.     Resident Name: José Luis Levy, pager #[phone]

## 2022-12-02 NOTE — ED PROVIDER NOTE - PHYSICAL EXAMINATION
CONSTITUTIONAL: Well-appearing; in no apparent distress.   Mouth: tooth #12 cavity noticed with no abscess; no tongue swelling noticed.   RESPIRATORY: No signs of respiratory distress.   CARDIOVASCULAR: Regular rate and rhythm.

## 2022-12-02 NOTE — ED PROVIDER NOTE - NSFOLLOWUPINSTRUCTIONS_ED_ALL_ED_FT
Please make sure to follow up with your primary care doctor in 3 days.    Dental Pain    Dental pain (toothache) may be caused by many things including tooth decay (cavities or caries), abscess or infection, injury, or the reason may be unknown. Your pain may only occur when you are chewing, are exposed to hot or cold temperature, are eating or drinking sugary foods or beverages, or your pain may be constant. If you were prescribed an antibiotic medicine, finish all of it even if you start to feel better. Rinsing your mouth with salt water or applying ice to the painful area of your face may help with the pain.    SEEK IMMEDIATE MEDICAL CARE IF YOU HAVE THE FOLLOWING SYMPTOMS: unable to open mouth, trouble breathing or swallowing, fever, or swelling of the face, neck or jaw.

## 2022-12-02 NOTE — ED PROVIDER NOTE - OBJECTIVE STATEMENT
56-year-old female with past medical history of hypertension who presents with dental pain.  Reports that pain is in the left upper tooth and the symptoms started last night.  Reports that she had a temporary cap to cover the cavity and awaiting for root canal therapy, but it fell off.  Denies recent trauma or injury.  Reports the pain is constant and worse with chewing.  Denies fever, abscess, tongue swelling, and shortness of breath.

## 2022-12-05 ENCOUNTER — OUTPATIENT (OUTPATIENT)
Dept: OUTPATIENT SERVICES | Facility: HOSPITAL | Age: 57
LOS: 1 days | Discharge: HOME | End: 2022-12-05

## 2022-12-05 DIAGNOSIS — Z98.891 HISTORY OF UTERINE SCAR FROM PREVIOUS SURGERY: Chronic | ICD-10-CM

## 2022-12-05 DIAGNOSIS — Z98.51 TUBAL LIGATION STATUS: Chronic | ICD-10-CM

## 2022-12-05 DIAGNOSIS — Z90.710 ACQUIRED ABSENCE OF BOTH CERVIX AND UTERUS: Chronic | ICD-10-CM

## 2022-12-06 DIAGNOSIS — K02.53 DENTAL CARIES ON PIT AND FISSURE SURFACE PENETRATING INTO PULP: ICD-10-CM

## 2023-08-25 ENCOUNTER — EMERGENCY (EMERGENCY)
Facility: HOSPITAL | Age: 58
LOS: 0 days | Discharge: HOME | End: 2023-08-25
Attending: STUDENT IN AN ORGANIZED HEALTH CARE EDUCATION/TRAINING PROGRAM
Payer: MEDICAID

## 2023-08-25 VITALS
RESPIRATION RATE: 18 BRPM | HEIGHT: 62 IN | TEMPERATURE: 99 F | HEART RATE: 98 BPM | OXYGEN SATURATION: 98 % | WEIGHT: 199.96 LBS | DIASTOLIC BLOOD PRESSURE: 83 MMHG | SYSTOLIC BLOOD PRESSURE: 185 MMHG

## 2023-08-25 DIAGNOSIS — M79.10 MYALGIA, UNSPECIFIED SITE: ICD-10-CM

## 2023-08-25 DIAGNOSIS — U07.1 COVID-19: ICD-10-CM

## 2023-08-25 DIAGNOSIS — R09.81 NASAL CONGESTION: ICD-10-CM

## 2023-08-25 DIAGNOSIS — Z98.51 TUBAL LIGATION STATUS: Chronic | ICD-10-CM

## 2023-08-25 DIAGNOSIS — I10 ESSENTIAL (PRIMARY) HYPERTENSION: ICD-10-CM

## 2023-08-25 DIAGNOSIS — Z98.891 HISTORY OF UTERINE SCAR FROM PREVIOUS SURGERY: Chronic | ICD-10-CM

## 2023-08-25 DIAGNOSIS — Z90.710 ACQUIRED ABSENCE OF BOTH CERVIX AND UTERUS: Chronic | ICD-10-CM

## 2023-08-25 DIAGNOSIS — J02.9 ACUTE PHARYNGITIS, UNSPECIFIED: ICD-10-CM

## 2023-08-25 LAB
FLUAV AG NPH QL: SIGNIFICANT CHANGE UP
FLUBV AG NPH QL: SIGNIFICANT CHANGE UP
RSV RNA NPH QL NAA+NON-PROBE: SIGNIFICANT CHANGE UP
SARS-COV-2 RNA SPEC QL NAA+PROBE: DETECTED

## 2023-08-25 PROCEDURE — 99284 EMERGENCY DEPT VISIT MOD MDM: CPT

## 2023-08-25 PROCEDURE — 0241U: CPT

## 2023-08-25 PROCEDURE — 99285 EMERGENCY DEPT VISIT HI MDM: CPT | Mod: 25

## 2023-08-25 PROCEDURE — 94640 AIRWAY INHALATION TREATMENT: CPT

## 2023-08-25 RX ORDER — ALBUTEROL 90 UG/1
2 AEROSOL, METERED ORAL EVERY 6 HOURS
Refills: 0 | Status: DISCONTINUED | OUTPATIENT
Start: 2023-08-25 | End: 2023-08-25

## 2023-08-25 RX ORDER — DEXAMETHASONE 0.5 MG/5ML
10 ELIXIR ORAL ONCE
Refills: 0 | Status: COMPLETED | OUTPATIENT
Start: 2023-08-25 | End: 2023-08-25

## 2023-08-25 RX ADMIN — Medication 10 MILLIGRAM(S): at 19:58

## 2023-08-25 RX ADMIN — ALBUTEROL 2 PUFF(S): 90 AEROSOL, METERED ORAL at 19:55

## 2023-08-25 NOTE — ED PROVIDER NOTE - PHYSICAL EXAMINATION
CONST: Well appearing in NAD  EYES: PERRL, EOMI, Sclera and conjunctiva clear.   ENT: Mild posterior pharyngeal erythema, large midline uvula.   CARD: Normal S1 S2; Normal rate and rhythm  RESP: Equal BS B/L, No wheezes, rhonchi or rales. No distress  GI: Soft, non-tender, non-distended.  MS: Normal ROM in all extremities. No midline spinal tenderness.  SKIN: Warm, dry, no acute rashes.   NEURO: A&Ox3, No focal deficits.

## 2023-08-25 NOTE — ED PROVIDER NOTE - OBJECTIVE STATEMENT
58 yo f pmhx htn presents complaining of body aches x 1 day. Patient w/ acute onset of body aches. Associated cough, sore throat, nasal congestion. admits to sick contacts at home with mom testing + for covid. Patient has been doing home test, but have so far been negative. Requesting swab and albuterol inhaler. No SOB, N/V/D, rash.

## 2023-08-25 NOTE — ED PROVIDER NOTE - PATIENT PORTAL LINK FT
You can access the FollowMyHealth Patient Portal offered by [facility] by registering at the following website: [email or web address]. By joining [employer]’s FollowMyHealth portal, you will also be able to view your health information using other applications (apps) compatible with our system.

## 2023-08-25 NOTE — ED PROVIDER NOTE - CLINICAL SUMMARY MEDICAL DECISION MAKING FREE TEXT BOX
57-year-old female with history of hypertension presenting today with URI symptoms associated with myalgias.  States her mother tested positive for COVID.  Denies any significant shortness of breath or chest pain.  Vitals stable, lungs noted with faint bronchospasm however no significant wheezing.  Otherwise exam unremarkable.  Patient given albuterol MDI, 6 dose of Decadron.  COVID swab sent.  Return precaution discussed in detail.

## 2023-08-27 RX ORDER — ALBUTEROL 90 UG/1
3 AEROSOL, METERED ORAL
Qty: 30 | Refills: 0
Start: 2023-08-27 | End: 2023-09-02

## 2024-09-29 ENCOUNTER — EMERGENCY (EMERGENCY)
Facility: HOSPITAL | Age: 59
LOS: 0 days | Discharge: HOME | End: 2024-09-30
Attending: EMERGENCY MEDICINE
Payer: SELF-PAY

## 2024-09-29 VITALS
DIASTOLIC BLOOD PRESSURE: 93 MMHG | HEIGHT: 64 IN | TEMPERATURE: 98 F | RESPIRATION RATE: 17 BRPM | WEIGHT: 199.96 LBS | SYSTOLIC BLOOD PRESSURE: 200 MMHG | HEART RATE: 79 BPM | OXYGEN SATURATION: 97 %

## 2024-09-29 DIAGNOSIS — R19.7 DIARRHEA, UNSPECIFIED: ICD-10-CM

## 2024-09-29 DIAGNOSIS — Z98.891 HISTORY OF UTERINE SCAR FROM PREVIOUS SURGERY: Chronic | ICD-10-CM

## 2024-09-29 DIAGNOSIS — R51.9 HEADACHE, UNSPECIFIED: ICD-10-CM

## 2024-09-29 DIAGNOSIS — Z90.710 ACQUIRED ABSENCE OF BOTH CERVIX AND UTERUS: Chronic | ICD-10-CM

## 2024-09-29 DIAGNOSIS — R11.2 NAUSEA WITH VOMITING, UNSPECIFIED: ICD-10-CM

## 2024-09-29 DIAGNOSIS — Z98.51 TUBAL LIGATION STATUS: Chronic | ICD-10-CM

## 2024-09-29 DIAGNOSIS — I10 ESSENTIAL (PRIMARY) HYPERTENSION: ICD-10-CM

## 2024-09-29 LAB
ALBUMIN SERPL ELPH-MCNC: 5 G/DL — SIGNIFICANT CHANGE UP (ref 3.5–5.2)
ALP SERPL-CCNC: 124 U/L — HIGH (ref 30–115)
ALT FLD-CCNC: 27 U/L — SIGNIFICANT CHANGE UP (ref 0–41)
ANION GAP SERPL CALC-SCNC: 16 MMOL/L — HIGH (ref 7–14)
APPEARANCE UR: CLEAR — SIGNIFICANT CHANGE UP
AST SERPL-CCNC: 21 U/L — SIGNIFICANT CHANGE UP (ref 0–41)
BASOPHILS # BLD AUTO: 0.06 K/UL — SIGNIFICANT CHANGE UP (ref 0–0.2)
BASOPHILS NFR BLD AUTO: 0.5 % — SIGNIFICANT CHANGE UP (ref 0–1)
BILIRUB DIRECT SERPL-MCNC: <0.2 MG/DL — SIGNIFICANT CHANGE UP (ref 0–0.3)
BILIRUB INDIRECT FLD-MCNC: >0 MG/DL — LOW (ref 0.2–1.2)
BILIRUB SERPL-MCNC: 0.2 MG/DL — SIGNIFICANT CHANGE UP (ref 0.2–1.2)
BILIRUB UR-MCNC: NEGATIVE — SIGNIFICANT CHANGE UP
BUN SERPL-MCNC: 17 MG/DL — SIGNIFICANT CHANGE UP (ref 10–20)
CALCIUM SERPL-MCNC: 10.1 MG/DL — SIGNIFICANT CHANGE UP (ref 8.4–10.4)
CHLORIDE SERPL-SCNC: 98 MMOL/L — SIGNIFICANT CHANGE UP (ref 98–110)
CO2 SERPL-SCNC: 24 MMOL/L — SIGNIFICANT CHANGE UP (ref 17–32)
COLOR SPEC: YELLOW — SIGNIFICANT CHANGE UP
CREAT SERPL-MCNC: 0.8 MG/DL — SIGNIFICANT CHANGE UP (ref 0.7–1.5)
DIFF PNL FLD: NEGATIVE — SIGNIFICANT CHANGE UP
EGFR: 86 ML/MIN/1.73M2 — SIGNIFICANT CHANGE UP
EOSINOPHIL # BLD AUTO: 0.18 K/UL — SIGNIFICANT CHANGE UP (ref 0–0.7)
EOSINOPHIL NFR BLD AUTO: 1.6 % — SIGNIFICANT CHANGE UP (ref 0–8)
GLUCOSE SERPL-MCNC: 139 MG/DL — HIGH (ref 70–99)
GLUCOSE UR QL: NEGATIVE MG/DL — SIGNIFICANT CHANGE UP
HCT VFR BLD CALC: 40.2 % — SIGNIFICANT CHANGE UP (ref 37–47)
HGB BLD-MCNC: 13.3 G/DL — SIGNIFICANT CHANGE UP (ref 12–16)
IMM GRANULOCYTES NFR BLD AUTO: 0.4 % — HIGH (ref 0.1–0.3)
KETONES UR-MCNC: NEGATIVE MG/DL — SIGNIFICANT CHANGE UP
LACTATE SERPL-SCNC: 1.4 MMOL/L — SIGNIFICANT CHANGE UP (ref 0.7–2)
LEUKOCYTE ESTERASE UR-ACNC: NEGATIVE — SIGNIFICANT CHANGE UP
LIDOCAIN IGE QN: 29 U/L — SIGNIFICANT CHANGE UP (ref 7–60)
LYMPHOCYTES # BLD AUTO: 2.57 K/UL — SIGNIFICANT CHANGE UP (ref 1.2–3.4)
LYMPHOCYTES # BLD AUTO: 23.5 % — SIGNIFICANT CHANGE UP (ref 20.5–51.1)
MCHC RBC-ENTMCNC: 27.1 PG — SIGNIFICANT CHANGE UP (ref 27–31)
MCHC RBC-ENTMCNC: 33.1 G/DL — SIGNIFICANT CHANGE UP (ref 32–37)
MCV RBC AUTO: 81.9 FL — SIGNIFICANT CHANGE UP (ref 81–99)
MONOCYTES # BLD AUTO: 0.58 K/UL — SIGNIFICANT CHANGE UP (ref 0.1–0.6)
MONOCYTES NFR BLD AUTO: 5.3 % — SIGNIFICANT CHANGE UP (ref 1.7–9.3)
NEUTROPHILS # BLD AUTO: 7.52 K/UL — HIGH (ref 1.4–6.5)
NEUTROPHILS NFR BLD AUTO: 68.7 % — SIGNIFICANT CHANGE UP (ref 42.2–75.2)
NITRITE UR-MCNC: NEGATIVE — SIGNIFICANT CHANGE UP
NRBC # BLD: 0 /100 WBCS — SIGNIFICANT CHANGE UP (ref 0–0)
PH UR: 6.5 — SIGNIFICANT CHANGE UP (ref 5–8)
PLATELET # BLD AUTO: 404 K/UL — HIGH (ref 130–400)
PMV BLD: 9.5 FL — SIGNIFICANT CHANGE UP (ref 7.4–10.4)
POTASSIUM SERPL-MCNC: 3.8 MMOL/L — SIGNIFICANT CHANGE UP (ref 3.5–5)
POTASSIUM SERPL-SCNC: 3.8 MMOL/L — SIGNIFICANT CHANGE UP (ref 3.5–5)
PROT SERPL-MCNC: 7.7 G/DL — SIGNIFICANT CHANGE UP (ref 6–8)
PROT UR-MCNC: NEGATIVE MG/DL — SIGNIFICANT CHANGE UP
RBC # BLD: 4.91 M/UL — SIGNIFICANT CHANGE UP (ref 4.2–5.4)
RBC # FLD: 14.6 % — HIGH (ref 11.5–14.5)
SODIUM SERPL-SCNC: 138 MMOL/L — SIGNIFICANT CHANGE UP (ref 135–146)
SP GR SPEC: <1.005 — LOW (ref 1–1.03)
UROBILINOGEN FLD QL: 0.2 MG/DL — SIGNIFICANT CHANGE UP (ref 0.2–1)
WBC # BLD: 10.95 K/UL — HIGH (ref 4.8–10.8)
WBC # FLD AUTO: 10.95 K/UL — HIGH (ref 4.8–10.8)

## 2024-09-29 PROCEDURE — 70450 CT HEAD/BRAIN W/O DYE: CPT | Mod: MC

## 2024-09-29 PROCEDURE — 80048 BASIC METABOLIC PNL TOTAL CA: CPT

## 2024-09-29 PROCEDURE — 93005 ELECTROCARDIOGRAM TRACING: CPT

## 2024-09-29 PROCEDURE — 85025 COMPLETE CBC W/AUTO DIFF WBC: CPT

## 2024-09-29 PROCEDURE — 81003 URINALYSIS AUTO W/O SCOPE: CPT

## 2024-09-29 PROCEDURE — 74177 CT ABD & PELVIS W/CONTRAST: CPT | Mod: MC

## 2024-09-29 PROCEDURE — 83690 ASSAY OF LIPASE: CPT

## 2024-09-29 PROCEDURE — 99285 EMERGENCY DEPT VISIT HI MDM: CPT | Mod: 25

## 2024-09-29 PROCEDURE — 96375 TX/PRO/DX INJ NEW DRUG ADDON: CPT

## 2024-09-29 PROCEDURE — 83605 ASSAY OF LACTIC ACID: CPT

## 2024-09-29 PROCEDURE — 74177 CT ABD & PELVIS W/CONTRAST: CPT | Mod: 26,MC

## 2024-09-29 PROCEDURE — 70450 CT HEAD/BRAIN W/O DYE: CPT | Mod: 26,MC

## 2024-09-29 PROCEDURE — 93010 ELECTROCARDIOGRAM REPORT: CPT

## 2024-09-29 PROCEDURE — 36415 COLL VENOUS BLD VENIPUNCTURE: CPT

## 2024-09-29 PROCEDURE — 80076 HEPATIC FUNCTION PANEL: CPT

## 2024-09-29 PROCEDURE — 99285 EMERGENCY DEPT VISIT HI MDM: CPT

## 2024-09-29 PROCEDURE — 96374 THER/PROPH/DIAG INJ IV PUSH: CPT | Mod: XU

## 2024-09-29 RX ORDER — METOCLOPRAMIDE HCL 5 MG
10 TABLET ORAL ONCE
Refills: 0 | Status: COMPLETED | OUTPATIENT
Start: 2024-09-29 | End: 2024-09-29

## 2024-09-29 RX ORDER — ACETAMINOPHEN 325 MG/1
975 TABLET ORAL ONCE
Refills: 0 | Status: COMPLETED | OUTPATIENT
Start: 2024-09-29 | End: 2024-09-29

## 2024-09-29 RX ORDER — SODIUM CHLORIDE 9 MG/ML
1000 INJECTION INTRAMUSCULAR; INTRAVENOUS; SUBCUTANEOUS ONCE
Refills: 0 | Status: COMPLETED | OUTPATIENT
Start: 2024-09-29 | End: 2024-09-29

## 2024-09-29 RX ORDER — FAMOTIDINE 10 MG/ML
20 INJECTION INTRAVENOUS ONCE
Refills: 0 | Status: COMPLETED | OUTPATIENT
Start: 2024-09-29 | End: 2024-09-29

## 2024-09-29 RX ORDER — KETOROLAC TROMETHAMINE 30 MG/ML
15 INJECTION, SOLUTION INTRAMUSCULAR ONCE
Refills: 0 | Status: DISCONTINUED | OUTPATIENT
Start: 2024-09-29 | End: 2024-09-29

## 2024-09-29 RX ADMIN — Medication 104 MILLIGRAM(S): at 21:15

## 2024-09-29 RX ADMIN — KETOROLAC TROMETHAMINE 15 MILLIGRAM(S): 30 INJECTION, SOLUTION INTRAMUSCULAR at 23:50

## 2024-09-29 RX ADMIN — Medication 25 GRAM(S): at 21:14

## 2024-09-29 RX ADMIN — SODIUM CHLORIDE 1000 MILLILITER(S): 9 INJECTION INTRAMUSCULAR; INTRAVENOUS; SUBCUTANEOUS at 21:15

## 2024-09-29 RX ADMIN — ACETAMINOPHEN 975 MILLIGRAM(S): 325 TABLET ORAL at 21:14

## 2024-09-29 NOTE — ED PROVIDER NOTE - PATIENT PORTAL LINK FT
You can access the FollowMyHealth Patient Portal offered by [facility] by registering at the following website: [email or web address]. By joining [facility]’s FollowMyHealth portal, you will also be able to view your health information using other applications (apps) compatible with our system.

## 2024-09-29 NOTE — ED PROVIDER NOTE - OBJECTIVE STATEMENT
58 year old F with hx of htn presenting to ed with mult complaints. Sts 30 min after eating soup had abd discomfort followed by 3-4 episodes of watery diarrhea and 2 episodes of vomiting. Sts when she stood up after vomiting had sudden onset pressure like headache that has been constant. Headache currently 8/10 and is not worsened with exertion/position. + nausea and upper and discomfort. No neck pain/photophobia/syncope, chest pain, sob, vision changes, speech changes, hx of headaches, bloody stools, urinary symptoms.

## 2024-09-29 NOTE — ED PROVIDER NOTE - PHYSICAL EXAMINATION
CONSTITUTIONAL: Well-appearing; well-nourished; in no apparent distress.   EYES: PERRL; EOM intact.   ENT: normal nose; no rhinorrhea; normal pharynx with no tonsillar hypertrophy.   NECK: Supple; non-tender; no cervical lymphadenopathy.   CARDIOVASCULAR: Normal S1, S2; no murmurs, rubs, or gallops.   RESPIRATORY: Normal chest excursion with respiration; breath sounds clear and equal bilaterally; no wheezes, rhonchi, or rales.  GI/: Normal bowel sounds; non-distended; non-tender; no palpable organomegaly.   MS: No evidence of trauma or deformity. Normal ROM in all four extremities; non-tender to palpation; distal pulses are normal.   SKIN: Normal for age and race; warm; dry; good turgor; no apparent lesions or exudate.   NEURO/PSYCH: A & O x 4; grossly unremarkable. No focal deficits. No facial droop. No tongue deviation. Cerebellar intact. Normal gait. Strength equal b/l 5/5 throughout. Sensation intact

## 2024-09-29 NOTE — ED PROVIDER NOTE - CLINICAL SUMMARY MEDICAL DECISION MAKING FREE TEXT BOX
Evaluated for headache and nausea vomiting and diarrhea.  CT head obtained without any acute pathology.  Lab work reviewed which was normal. Evaluated for headache and nausea vomiting and diarrhea.  CT head obtained without any acute pathology.  Lab work reviewed which was normal. ct abd nml. symptoms improved tolerating po Patient to be discharged from ED. Any available test results were discussed with patient and/or family. Verbal instructions given, including instructions to return to ED immediately for any new, worsening, or concerning symptoms. Patient endorsed understanding. Written discharge instructions additionally given, including follow-up plan.

## 2024-09-29 NOTE — ED PROVIDER NOTE - RHYTHM STRIP/ULTRASOUND IMAGES/CT SCAN IMAGES SHOWED
Independent interpretation of the CT scan as a preliminary reading by Dr. Boris Mota shows no acute hemorraghe

## 2024-09-29 NOTE — ED PROVIDER NOTE - EKG/XRAY ADDITIONAL INFORMATION
Heart rate 79 normal sinus rhythm AK interval 150 QRS 86 QTc 433 no ST elevations or depressions normal intervals normal R wave progression

## 2024-09-29 NOTE — ED PROVIDER NOTE - ATTENDING APP SHARED VISIT CONTRIBUTION OF CARE
patient evaluated for headache with vomiting and diarrhea after eating. now symptoms improved. BP elevated. abd tender without rebound or guarding. plan is to obtain labs, and ct imaging

## 2024-09-29 NOTE — ED PROVIDER NOTE - NSFOLLOWUPINSTRUCTIONS_ED_ALL_ED_FT

## 2024-09-30 VITALS — SYSTOLIC BLOOD PRESSURE: 132 MMHG | HEART RATE: 82 BPM | DIASTOLIC BLOOD PRESSURE: 80 MMHG
